# Patient Record
Sex: FEMALE | Race: WHITE | NOT HISPANIC OR LATINO | Employment: OTHER
[De-identification: names, ages, dates, MRNs, and addresses within clinical notes are randomized per-mention and may not be internally consistent; named-entity substitution may affect disease eponyms.]

---

## 2017-07-01 ENCOUNTER — HEALTH MAINTENANCE LETTER (OUTPATIENT)
Age: 57
End: 2017-07-01

## 2019-07-19 ENCOUNTER — TELEPHONE (OUTPATIENT)
Dept: SCHEDULING | Facility: CLINIC | Age: 59
End: 2019-07-19

## 2019-10-03 ENCOUNTER — HEALTH MAINTENANCE LETTER (OUTPATIENT)
Age: 59
End: 2019-10-03

## 2020-03-09 ENCOUNTER — TRANSFERRED RECORDS (OUTPATIENT)
Dept: PHYSICAL THERAPY | Facility: CLINIC | Age: 60
End: 2020-03-09

## 2020-03-09 NOTE — PROGRESS NOTES
[facility]  [address] [facility] [address] [phone]  Dept: [phone]    PRE-OP EVALUATION:  Today's date: 3/10/2020    Alma Mata (: 1960) presents for pre-operative evaluation assessment as requested by Dr. Shaffer.  She requires evaluation and anesthesia risk assessment prior to undergoing surgery/procedure for treatment of Cataracts- Bilateral .    Fax number for surgical facility: [phone]  Primary Physician: No primary care provider on file.  Type of Anesthesia Anticipated: to be determined    Patient has a Health Care Directive or Living Will:  YES     Preop Questions 3/7/2020   Who is doing your surgery? Dr. Kong Shaffer   What are you having done? cataract removal and lens insertion   Date of Surgery/Procedure: Left eye 3/16  right eye  cataract surgery   Facility or Hospital where procedure/surgery will be performed: [facility]   1.  Do you have a history of Heart attack, stroke, stent, coronary bypass surgery, or other heart surgery? No   2.  Do you ever have any pain or discomfort in your chest? No   3.  Do you have a history of  Heart Failure? No   4.   Are you troubled by shortness of breath when:  walking on a level surface, or up a slight hill, or at night? No   5.  Do you currently have a cold, bronchitis or other respiratory infection? No   6.  Do you have a cough, shortness of breath, or wheezing? No   7.  Do you sometimes get pains in the calves of your legs when you walk? No   8. Do you or anyone in your family have previous history of blood clots? No   9.  Do you or does anyone in your family have a serious bleeding problem such as prolonged bleeding following surgeries or cuts? No   10. Have you ever had problems with anemia or been told to take iron pills? No   11. Have you had any abnormal blood loss such as black, tarry or bloody stools, or abnormal vaginal bleeding? No   12. Have you ever had a blood transfusion? No    13. Have you or any of your relatives ever had problems with anesthesia? YES - Mother and sister both had nausea/vomiting after lengthy surgeries, no personal problems with anesthesia   14. Do you have sleep apnea, excessive snoring or daytime drowsiness? No   15. Do you have any prosthetic heart valves? No   16. Do you have prosthetic joints? No   17. Is there any chance that you may be pregnant? No         HPI:     HPI related to upcoming procedure: First diagnosed with glaucoma 4 years ago and subsequently diagnosed with bilateral cataracts last year. Will have a lens replacement with cataract removal.          MEDICAL HISTORY:     Patient Active Problem List    Diagnosis Date Noted     Glaucoma (increased eye pressure) 01/01/2018     Priority: Medium     Advanced directives, counseling/discussion 12/01/2016     Priority: Medium     Advance Care Planning 12/1/2016: ACP Review of Chart / Resources Provided:  Reviewed chart for advance care plan.  Alma ANTOINE Mata has no plan or code status on file however states presence of ACP document. Copy requested. Confirmed code status reflects current choices pending receipt of document/advance care plan review.  Confirmed/documented legally designated decision makers.  Added by Bel Browne           Partial rectal prolapse 01/01/2015     Priority: Medium     Allergic rhinitis 08/14/2007     Priority: Medium     Problem list name updated by automated process. Provider to review        Past Medical History:   Diagnosis Date     Arthritis     slight arthritis in right hand     CARDIOVASCULAR SCREENING; LDL GOAL LESS THAN 160 2/10/2010     Subclinical hyperthyroidism      Past Surgical History:   Procedure Laterality Date     COLONOSCOPY  1/6/2009     GI SURGERY  1/23/2009    Surgery for prolapsed rectum     SURGICAL HISTORY OF -   11/2003    Left thumb surgery after laceration     Current Outpatient Medications   Medication Sig Dispense Refill     ASPIRIN PO Take 81 mg by  "mouth       calcium-vitamin D 500-125 MG-UNIT TABS                      MULTI-VITAMIN OR TABS 1 TABLET DAILY 30 0            Timolol  Latanoprost               OTC products: None, except as noted above    No Known Allergies   Latex Allergy: NO    Social History     Tobacco Use     Smoking status: Never Smoker     Smokeless tobacco: Never Used   Substance Use Topics     Alcohol use: No     History   Drug Use No       REVIEW OF SYSTEMS:   CONSTITUTIONAL: NEGATIVE for fever, chills, change in weight  EYES: NEGATIVE for vision changes or irritation  ENT/MOUTH: NEGATIVE for ear, mouth and throat problems  RESP: NEGATIVE for significant cough or SOB  BREAST: NEGATIVE for masses, tenderness or discharge  CV: NEGATIVE for chest pain, palpitations or peripheral edema  GI: NEGATIVE for nausea, abdominal pain, heartburn, or change in bowel habits  : NEGATIVE for frequency, dysuria, or hematuria  MUSCULOSKELETAL: NEGATIVE for significant arthralgias or myalgia  NEURO: NEGATIVE for weakness, dizziness or paresthesias  HEME: NEGATIVE for bleeding problems  PSYCHIATRIC: NEGATIVE for changes in mood or affect    EXAM:   /82 (BP Location: Right arm, Patient Position: Sitting, Cuff Size: Adult Regular)   Pulse 83   Temp 98.6  F (37  C) (Oral)   Ht 1.753 m (5' 9\")   Wt 78.5 kg (173 lb)   SpO2 97%   BMI 25.55 kg/m      GENERAL APPEARANCE: healthy, alert and no acute distress     EYES: EOMI, PERRL     HENT: ear canals and TM's normal and nose and mouth without ulcers or lesions     NECK: no adenopathy, no asymmetry, masses, or scars and thyroid normal to palpation     RESP: lungs clear to auscultation - no rales, rhonchi or wheezes     CV: regular rates and rhythm, normal S1 S2, no S3 or S4 and no murmur, click or rub     ABDOMEN:  soft, nontender, no HSM or masses and bowel sounds normal     MS: extremities normal- no gross deformities noted, no evidence of inflammation in joints, FROM in all extremities.     NEURO: " Normal strength and tone, sensory exam grossly normal, mentation intact and speech normal, CN 2-12 intact     PSYCH: mentation appears normal. and affect normal/bright     LYMPHATICS: No cervical adenopathy    DIAGNOSTICS:   No labs or EKG required for low risk surgery (cataract, skin procedure, breast biopsy, etc)    Recent Labs   Lab Test 11/17/16  0830   HGB 12.2         POTASSIUM 4.0   CR 0.75        IMPRESSION:   Reason for surgery/procedure: Cataracts    The proposed surgical procedure is considered LOW risk.    REVISED CARDIAC RISK INDEX  The patient has the following serious cardiovascular risks for perioperative complications such as (MI, PE, VFib and 3  AV Block):  No serious cardiac risks  INTERPRETATION: 0 risks: Class I (very low risk - 0.4% complication rate)    The patient has the following additional risks for perioperative complications:  No identified additional risks      ICD-10-CM    1. Preop general physical exam  Z01.818    2. Cataract of both eyes, unspecified cataract type  H26.9    3. Lipid screening  Z13.220 Lipid panel reflex to direct LDL Fasting   4. Encounter for HCV screening test for low risk patient  Z11.59 **Hepatitis C Screen Reflex to RNA FUTURE anytime   5. Encounter for screening for human immunodeficiency virus (HIV)  Z11.4 **HIV Antigen Antibody Combo FUTURE anytime   6. Preventative health care  Z00.00 **Comprehensive metabolic panel FUTURE anytime     **CBC with platelets FUTURE anytime   7. Hyperthyroidism, subclinical  E05.90 **TSH with free T4 reflex FUTURE anytime   8. Screening for breast cancer  Z12.39 *MA Screening Digital Bilateral     Preoperative exam completed.  Future/preventative labs ordered and patient will follow-up for lab only appointment.    RECOMMENDATIONS:       --Patient is to take all scheduled medications on the day of surgery EXCEPT for modifications listed below: Aspirin stop 4 days before cataract surgery and 10 days prior for rectal  surgery.      APPROVAL GIVEN to proceed with proposed procedure, without further diagnostic evaluation       Maya Jimenez, RN, Student FNP, [employer]    History and physical examination done with student nurse practitioner.  I agree with assessment and plan for this patient.     Signed Electronically by: ABHAY Koroma CNP      [facility] Preop Guidelines    Revised Cardiac Risk Index

## 2020-03-10 ENCOUNTER — OFFICE VISIT (OUTPATIENT)
Dept: FAMILY MEDICINE | Facility: CLINIC | Age: 60
End: 2020-03-10
Payer: COMMERCIAL

## 2020-03-10 VITALS
DIASTOLIC BLOOD PRESSURE: 82 MMHG | BODY MASS INDEX: 25.62 KG/M2 | TEMPERATURE: 98.6 F | SYSTOLIC BLOOD PRESSURE: 128 MMHG | OXYGEN SATURATION: 97 % | HEIGHT: 69 IN | WEIGHT: 173 LBS | HEART RATE: 83 BPM

## 2020-03-10 DIAGNOSIS — Z12.39 SCREENING FOR BREAST CANCER: ICD-10-CM

## 2020-03-10 DIAGNOSIS — Z01.818 PREOP GENERAL PHYSICAL EXAM: Primary | ICD-10-CM

## 2020-03-10 DIAGNOSIS — H26.9 CATARACT OF BOTH EYES, UNSPECIFIED CATARACT TYPE: ICD-10-CM

## 2020-03-10 DIAGNOSIS — Z11.4 ENCOUNTER FOR SCREENING FOR HUMAN IMMUNODEFICIENCY VIRUS (HIV): ICD-10-CM

## 2020-03-10 DIAGNOSIS — Z11.59 ENCOUNTER FOR HCV SCREENING TEST FOR LOW RISK PATIENT: ICD-10-CM

## 2020-03-10 DIAGNOSIS — Z13.220 LIPID SCREENING: ICD-10-CM

## 2020-03-10 DIAGNOSIS — Z00.00 PREVENTATIVE HEALTH CARE: ICD-10-CM

## 2020-03-10 DIAGNOSIS — E05.90 HYPERTHYROIDISM, SUBCLINICAL: ICD-10-CM

## 2020-03-10 PROBLEM — H40.9 GLAUCOMA (INCREASED EYE PRESSURE): Status: ACTIVE | Noted: 2018-01-01

## 2020-03-10 PROCEDURE — 99203 OFFICE O/P NEW LOW 30 MIN: CPT | Performed by: NURSE PRACTITIONER

## 2020-03-10 RX ORDER — TIMOLOL MALEATE 5 MG/ML
1 SOLUTION/ DROPS OPHTHALMIC EVERY MORNING
COMMUNITY
Start: 2018-01-01

## 2020-03-10 RX ORDER — LATANOPROST 50 UG/ML
1 SOLUTION/ DROPS OPHTHALMIC EVERY EVENING
COMMUNITY
Start: 2018-01-01

## 2020-03-10 ASSESSMENT — MIFFLIN-ST. JEOR: SCORE: 1419.1

## 2020-03-10 NOTE — PROGRESS NOTES
[facility]  [address] [id number] [phone]  Dept: [phone]    PRE-OP EVALUATION:  Today's date: 3/10/2020    Alma Mtaa (: 1960) presents for pre-operative evaluation assessment as requested by  ***.  She requires evaluation and anesthesia risk assessment prior to undergoing surgery/procedure for treatment of *** .    {PREOP QUESTIONNAIRE OPTIONS (by MA):621863}    HPI:     HPI related to upcoming procedure: ***      {Ch. Problems:462953}    MEDICAL HISTORY:     Patient Active Problem List    Diagnosis Date Noted     Advanced directives, counseling/discussion 2016     Priority: Medium     Advance Care Planning 2016: ACP Review of Chart / Resources Provided:  Reviewed chart for advance care plan.  Alma Mata has no plan or code status on file however states presence of ACP document. Copy requested. Confirmed code status reflects current choices pending receipt of document/advance care plan review.  Confirmed/documented legally designated decision makers.  Added by Bel Browne           Subclinical hyperthyroidism 2010     Priority: Medium     CARDIOVASCULAR SCREENING; LDL GOAL LESS THAN 160 02/10/2010     Priority: Medium     Allergic rhinitis 2007     Priority: Medium     Problem list name updated by automated process. Provider to review        No past medical history on file.  Past Surgical History:   Procedure Laterality Date     SURGICAL HISTORY OF -   2003    Left thumb surgery after laceration     Current Outpatient Medications   Medication Sig Dispense Refill     ASPIRIN PO Take 81 mg by mouth       calcium-vitamin D 500-125 MG-UNIT TABS        Cyanocobalamin (VITAMIN B 12 PO)        DiphenhydrAMINE HCl (BENADRYL PO)        MULTI-VITAMIN OR TABS 1 TABLET DAILY 30 0     Pseudoephedrine HCl (SUDAFED PO)        VITAMIN E COMPLEX PO        ZYRTEC 10 MG PO TABS 1 TABLET DAILY       OTC products: {OTC ANALGESICS:757646}    No  "Known Allergies   Latex Allergy: {YES/NO WITH DEFAULT:928758::\"NO\"}    Social History     Tobacco Use     Smoking status: Never Smoker     Smokeless tobacco: Never Used   Substance Use Topics     Alcohol use: No     History   Drug Use No       REVIEW OF SYSTEMS:   {ROS Preop Choices:881657}    EXAM:   There were no vitals taken for this visit.  {EXAM Preop Choices:424486}    DIAGNOSTICS:   {DIAGNOSTIC FOR PREOP:789140}    Recent Labs   Lab Test 11/17/16  0830   HGB 12.2         POTASSIUM 4.0   CR 0.75        IMPRESSION:   {PREOP REASONS:568614::\"Reason for surgery/procedure: ***\",\"Diagnosis/reason for consult: ***\"}    The proposed surgical procedure is considered {HIGH=major cardiovascular or procedures requiring prolonged anesthesia >4 hours or large fluid shifts;    INTERMEDIATE=abdominal, most orthopedic and intrathoracic surgery; LOW= endoscopy, cataract and breast surgery:010369} risk.    REVISED CARDIAC RISK INDEX  The patient has the following serious cardiovascular risks for perioperative complications such as (MI, PE, VFib and 3  AV Block):  {PREOP REVISED CARDIAC INDEX (RCI):879264:p:\"No serious cardiac risks\"}  INTERPRETATION: {REVISED CARDIAC RISK INTERPRETATION:360221}    The patient has the following additional risks for perioperative complications:  {Additional perioperative risks:553044:p:\"No identified additional risks\"}      ICD-10-CM    1. Preop general physical exam  Z01.818    2. Screening for malignant neoplasm of cervix  Z12.4 Pap imaged thin layer screen with HPV - recommended age 30 - 65 years (select HPV order below)     HPV High Risk Types DNA Cervical       RECOMMENDATIONS:     {IMPORTANT - Conditions - complete carefully!!:871337}    {IMPORTANT - Medications:686241::\"--Patient is to take all scheduled medications on the day of surgery EXCEPT for modifications listed below.\"}    {IMPORTANT - Approval:315996:p:\"APPROVAL GIVEN to proceed with proposed procedure, without " "further diagnostic evaluation\"}       Signed Electronically by: ABHAY Koroma CNP    Copy of this evaluation report is provided to requesting physician.    [facility] Preop Guidelines    Revised Cardiac Risk Index  "

## 2020-03-12 ENCOUNTER — TELEPHONE (OUTPATIENT)
Dept: FAMILY MEDICINE | Facility: CLINIC | Age: 60
End: 2020-03-12

## 2020-03-12 NOTE — TELEPHONE ENCOUNTER
[facility] is calling because they need the pre-op signed for upcoming surgery.    Carisa Diaz  Patient Representative

## 2020-05-01 DIAGNOSIS — Z11.59 ENCOUNTER FOR SCREENING FOR OTHER VIRAL DISEASES: Primary | ICD-10-CM

## 2020-05-04 ENCOUNTER — OFFICE VISIT (OUTPATIENT)
Dept: URGENT CARE | Facility: URGENT CARE | Age: 60
End: 2020-05-04
Attending: COLON & RECTAL SURGERY
Payer: COMMERCIAL

## 2020-05-04 DIAGNOSIS — Z11.59 ENCOUNTER FOR SCREENING FOR OTHER VIRAL DISEASES: ICD-10-CM

## 2020-05-04 PROCEDURE — 99207 ZZC NO BILLABLE SERVICE THIS VISIT: CPT

## 2020-05-04 PROCEDURE — 99000 SPECIMEN HANDLING OFFICE-LAB: CPT | Performed by: COLON & RECTAL SURGERY

## 2020-05-04 PROCEDURE — U0003 INFECTIOUS AGENT DETECTION BY NUCLEIC ACID (DNA OR RNA); SEVERE ACUTE RESPIRATORY SYNDROME CORONAVIRUS 2 (SARS-COV-2) (CORONAVIRUS DISEASE [COVID-19]), AMPLIFIED PROBE TECHNIQUE, MAKING USE OF HIGH THROUGHPUT TECHNOLOGIES AS DESCRIBED BY CMS-2020-01-R: HCPCS | Mod: 90 | Performed by: COLON & RECTAL SURGERY

## 2020-05-05 ENCOUNTER — OFFICE VISIT (OUTPATIENT)
Dept: FAMILY MEDICINE | Facility: CLINIC | Age: 60
End: 2020-05-05
Payer: COMMERCIAL

## 2020-05-05 VITALS
HEIGHT: 69 IN | BODY MASS INDEX: 25.77 KG/M2 | WEIGHT: 174 LBS | TEMPERATURE: 97.5 F | DIASTOLIC BLOOD PRESSURE: 68 MMHG | HEART RATE: 91 BPM | SYSTOLIC BLOOD PRESSURE: 110 MMHG | OXYGEN SATURATION: 97 %

## 2020-05-05 DIAGNOSIS — H26.9 CATARACT OF RIGHT EYE, UNSPECIFIED CATARACT TYPE: ICD-10-CM

## 2020-05-05 DIAGNOSIS — K62.3 RECTAL PROLAPSE: ICD-10-CM

## 2020-05-05 DIAGNOSIS — Z01.818 PREOP GENERAL PHYSICAL EXAM: Primary | ICD-10-CM

## 2020-05-05 LAB
ERYTHROCYTE [DISTWIDTH] IN BLOOD BY AUTOMATED COUNT: 15.8 % (ref 10–15)
HCT VFR BLD AUTO: 37.3 % (ref 35–47)
HGB BLD-MCNC: 11.8 G/DL (ref 11.7–15.7)
MCH RBC QN AUTO: 25 PG (ref 26.5–33)
MCHC RBC AUTO-ENTMCNC: 31.6 G/DL (ref 31.5–36.5)
MCV RBC AUTO: 79 FL (ref 78–100)
PLATELET # BLD AUTO: 302 10E9/L (ref 150–450)
RBC # BLD AUTO: 4.72 10E12/L (ref 3.8–5.2)
SARS-COV-2 RNA SPEC QL NAA+PROBE: NOT DETECTED
SPECIMEN SOURCE: NORMAL
WBC # BLD AUTO: 4.5 10E9/L (ref 4–11)

## 2020-05-05 PROCEDURE — 85027 COMPLETE CBC AUTOMATED: CPT | Performed by: FAMILY MEDICINE

## 2020-05-05 PROCEDURE — 99214 OFFICE O/P EST MOD 30 MIN: CPT | Performed by: FAMILY MEDICINE

## 2020-05-05 PROCEDURE — 36415 COLL VENOUS BLD VENIPUNCTURE: CPT | Performed by: FAMILY MEDICINE

## 2020-05-05 ASSESSMENT — MIFFLIN-ST. JEOR: SCORE: 1423.64

## 2020-05-05 NOTE — PROGRESS NOTES
[facility] PRIOR [address] [address]  [phone]  Dept: [phone]    PRE-OP EVALUATION:  Today's date: 2020    Alma Mata (: 1960) presents for pre-operative evaluation assessment as requested by Dr. Kothari and Dr. Caldwell.  She requires evaluation and anesthesia risk assessment prior to undergoing surgery/procedure for treatment of ROBOTIC ASSISTED RECTOPEXY WITH MESH AND PROCTOSCOPY .     Also having right cataract surgery on  -- with Dr. Shaffer    Fax number for surgical facility:  Primary Physician: Margaret Parr  Type of Anesthesia Anticipated: General    Patient has a Health Care Directive or Living Will:  NO    Preop Questions 2020   Who is doing your surgery? Dr. Jo Ann Kothari   What are you having done? Retropexy   Date of Surgery/Procedure: 2020   Facility or Hospital where procedure/surgery will be performed: [facility]   1.  Do you have a history of Heart attack, stroke, stent, coronary bypass surgery, or other heart surgery? No   2.  Do you ever have any pain or discomfort in your chest? No   3.  Do you have a history of  Heart Failure? No   4.   Are you troubled by shortness of breath when:  walking on a level surface, or up a slight hill, or at night? No   5.  Do you currently have a cold, bronchitis or other respiratory infection? No   6.  Do you have a cough, shortness of breath, or wheezing? No   7.  Do you sometimes get pains in the calves of your legs when you walk? No   8. Do you or anyone in your family have previous history of blood clots? No   9.  Do you or does anyone in your family have a serious bleeding problem such as prolonged bleeding following surgeries or cuts? No   10. Have you ever had problems with anemia or been told to take iron pills? No   11. Have you had any abnormal blood loss such as black, tarry or bloody stools, or abnormal vaginal bleeding? No   12. Have you ever had a blood  transfusion? No   13. Have you or any of your relatives ever had problems with anesthesia? YES, had a bit of nausea int the past.   14. Do you have sleep apnea, excessive snoring or daytime drowsiness? No   15. Do you have any prosthetic heart valves? No   16. Do you have prosthetic joints? No   17. Is there any chance that you may be pregnant? No         HPI:     HPI related to upcoming procedure: history of rectal prolapse. Has had 2 surgeries in 2009 with recurrence.    Also having right cataract surgery. She had left cataract sugery on 3/16. Right was initially cancelled due to COVID-19 but now rescheduled for June 1.       See problem list for active medical problems.  Problems all longstanding and stable, except as noted/documented.  See ROS for pertinent symptoms related to these conditions.      MEDICAL HISTORY:     Patient Active Problem List    Diagnosis Date Noted     Glaucoma (increased eye pressure) 01/01/2018     Priority: Medium     Advanced directives, counseling/discussion 12/01/2016     Priority: Medium     Advance Care Planning 12/1/2016: ACP Review of Chart / Resources Provided:  Reviewed chart for advance care plan.  Alma Mata has no plan or code status on file however states presence of ACP document. Copy requested. Confirmed code status reflects current choices pending receipt of document/advance care plan review.  Confirmed/documented legally designated decision makers.  Added by Bel Browne           Partial rectal prolapse 01/01/2015     Priority: Medium     Allergic rhinitis 08/14/2007     Priority: Medium     Problem list name updated by automated process. Provider to review        Past Medical History:   Diagnosis Date     Arthritis     slight arthritis in right hand     CARDIOVASCULAR SCREENING; LDL GOAL LESS THAN 160 2/10/2010     Subclinical hyperthyroidism      Past Surgical History:   Procedure Laterality Date     COLONOSCOPY  1/6/2009     GI SURGERY  1/23/2009    Surgery for  "prolapsed rectum     SURGICAL HISTORY OF -   11/2003    Left thumb surgery after laceration     Current Outpatient Medications   Medication Sig Dispense Refill     ASPIRIN PO Take 81 mg by mouth       calcium-vitamin D 500-125 MG-UNIT TABS        DiphenhydrAMINE HCl (BENADRYL PO)        latanoprost (XALATAN) 0.005 % ophthalmic solution        MULTI-VITAMIN OR TABS 1 TABLET DAILY 30 0     Pseudoephedrine HCl (SUDAFED PO)        timolol maleate (TIMOPTIC) 0.5 % ophthalmic solution        ZYRTEC 10 MG PO TABS 1 TABLET DAILY       OTC products: None, except as noted above    No Known Allergies   Latex Allergy: NO    Social History     Tobacco Use     Smoking status: Never Smoker     Smokeless tobacco: Never Used   Substance Use Topics     Alcohol use: No     History   Drug Use No       REVIEW OF SYSTEMS:   CONSTITUTIONAL: NEGATIVE for fever, chills, change in weight  INTEGUMENTARY/SKIN: NEGATIVE for worrisome rashes, moles or lesions  EYES: NEGATIVE for vision changes or irritation  ENT/MOUTH: NEGATIVE for ear, mouth and throat problems  RESP: NEGATIVE for significant cough or SOB  BREAST: NEGATIVE for masses, tenderness or discharge  CV: NEGATIVE for chest pain, palpitations or peripheral edema  GI: NEGATIVE for nausea, abdominal pain, heartburn, or change in bowel habits  : NEGATIVE for frequency, dysuria, or hematuria  MUSCULOSKELETAL: NEGATIVE for significant arthralgias or myalgia  NEURO: NEGATIVE for weakness, dizziness or paresthesias  ENDOCRINE: NEGATIVE for temperature intolerance, skin/hair changes  HEME: NEGATIVE for bleeding problems  PSYCHIATRIC: NEGATIVE for changes in mood or affect    EXAM:   /68 (BP Location: Right arm, Cuff Size: Adult Regular)   Pulse 91   Temp 97.5  F (36.4  C) (Oral)   Ht 1.753 m (5' 9\")   Wt 78.9 kg (174 lb)   SpO2 97%   BMI 25.70 kg/m      GENERAL APPEARANCE: healthy, alert and no distress     EYES: EOMI, PERRL     HENT: ear canals and TM's normal and nose and mouth " without ulcers or lesions     NECK: no adenopathy, no asymmetry, masses, or scars and thyroid normal to palpation     RESP: lungs clear to auscultation - no rales, rhonchi or wheezes     CV: regular rates and rhythm, normal S1 S2, no S3 or S4 and no murmur, click or rub     ABDOMEN:  soft, nontender, no HSM or masses and bowel sounds normal     MS: extremities normal- no gross deformities noted, no evidence of inflammation in joints, FROM in all extremities.     SKIN: no suspicious lesions or rashes     NEURO: Normal strength and tone, sensory exam grossly normal, mentation intact and speech normal     PSYCH: mentation appears normal. and affect normal/bright     LYMPHATICS: No cervical adenopathy    DIAGNOSTICS:   EKG: Not indicated due to non-vascular surgery and low risk of event (age <65 and without cardiac risk factors)    Recent Labs   Lab Test 11/17/16  0830   HGB 12.2         POTASSIUM 4.0   CR 0.75        IMPRESSION:   Reason for surgery/procedure: rectal prolapse  Diagnosis/reason for consult: preoperative clearance    The proposed surgical procedure is considered INTERMEDIATE risk.    REVISED CARDIAC RISK INDEX  The patient has the following serious cardiovascular risks for perioperative complications such as (MI, PE, VFib and 3  AV Block):  No serious cardiac risks  INTERPRETATION: 0 risks: Class I (very low risk - 0.4% complication rate)    The patient has the following additional risks for perioperative complications:  No identified additional risks      ICD-10-CM    1. Preop general physical exam  Z01.818    2. Rectal prolapse  K62.3 CBC with platelets   3. Cataract of right eye, unspecified cataract type  H26.9        RECOMMENDATIONS:       --Patient is to take all scheduled medications on the day of surgery EXCEPT for modifications listed below.    Anticoagulant or Antiplatelet Medication Use  ASPIRIN: Discontinue ASA 7-10 days prior to procedure to reduce bleeding risk.  It should be  resumed post-operatively.        APPROVAL GIVEN to proceed with proposed procedures, without further diagnostic evaluation       Signed Electronically by: Kong Barraza DO    Copy of this evaluation report is provided to requesting physician.    [facility] Preop Guidelines    Revised Cardiac Risk Index

## 2020-05-07 ENCOUNTER — ANESTHESIA EVENT (OUTPATIENT)
Dept: SURGERY | Facility: CLINIC | Age: 60
DRG: 331 | End: 2020-05-07
Payer: COMMERCIAL

## 2020-05-07 RX ORDER — FLUTICASONE PROPIONATE 50 MCG
1 SPRAY, SUSPENSION (ML) NASAL DAILY PRN
COMMUNITY

## 2020-05-07 RX ORDER — PSEUDOEPHEDRINE HCL 30 MG
TABLET ORAL EVERY 4 HOURS PRN
COMMUNITY
End: 2023-06-07

## 2020-05-07 RX ORDER — DIPHENHYDRAMINE HCL 25 MG
25 TABLET ORAL
COMMUNITY

## 2020-05-07 RX ORDER — CETIRIZINE HYDROCHLORIDE 10 MG/1
10 TABLET ORAL DAILY PRN
COMMUNITY
End: 2023-06-07

## 2020-05-07 NOTE — PROGRESS NOTES
PTA medications updated by Medication Scribe day before surgery via phone call with patient      -LAST DOSES ENTERED BY NURSE-    Medication history sources: Patient, Surescripts and H&P  Medication history source reliability: Good  Adherence assessment: N/A Not Observed    Significant changes made to the medication list:  None      Additional medication history information:   Pt intends to bring home meds: Timolol eye drops, Latanoprost eye drops, Flonase Nasal spray        Prior to Admission medications    Medication Sig Last Dose Taking? Auth Provider   ASPIRIN PO Take 81 mg by mouth every evening   at PM Yes Reported, Patient   calcium-vitamin D 500-125 MG-UNIT TABS Take 1 tablet by mouth daily   at AM Yes Reported, Patient   cetirizine (ZYRTEC) 10 MG tablet Take 10 mg by mouth daily as needed for allergies (seasonal allergies)  at prn Yes Reported, Patient   diphenhydrAMINE (BENADRYL) 25 MG tablet Take 25 mg by mouth nightly as needed for itching or allergies (allergies)  Yes Reported, Patient   fluticasone (FLONASE) 50 MCG/ACT nasal spray Spray 1 spray into both nostrils daily  at AM Yes Reported, Patient   latanoprost (XALATAN) 0.005 % ophthalmic solution Place 1 drop into both eyes every evening   at PM Yes Reported, Patient   MULTI-VITAMIN OR TABS Take 1 tablet by mouth daily   at AM Yes Dayami Cruz MD   pseudoePHEDrine (SUDAFED) 30 MG tablet Take by mouth every 4 hours as needed for congestion (seasonal allergies)  at prn Yes Reported, Patient   timolol maleate (TIMOPTIC) 0.5 % ophthalmic solution Place 1 drop into both eyes every morning   Yes Reported, Patient

## 2020-05-08 ENCOUNTER — HOSPITAL ENCOUNTER (INPATIENT)
Facility: CLINIC | Age: 60
LOS: 1 days | Discharge: HOME | DRG: 331 | End: 2020-05-09
Attending: COLON & RECTAL SURGERY | Admitting: COLON & RECTAL SURGERY
Payer: COMMERCIAL

## 2020-05-08 ENCOUNTER — ANESTHESIA (OUTPATIENT)
Dept: SURGERY | Facility: CLINIC | Age: 60
DRG: 331 | End: 2020-05-08
Payer: COMMERCIAL

## 2020-05-08 DIAGNOSIS — K62.3 RECTAL PROLAPSE: Primary | ICD-10-CM

## 2020-05-08 PROCEDURE — 25000128 H RX IP 250 OP 636: Performed by: COLON & RECTAL SURGERY

## 2020-05-08 PROCEDURE — 25000128 H RX IP 250 OP 636: Performed by: NURSE ANESTHETIST, CERTIFIED REGISTERED

## 2020-05-08 PROCEDURE — 25000125 ZZHC RX 250: Performed by: PHYSICIAN ASSISTANT

## 2020-05-08 PROCEDURE — 25000132 ZZH RX MED GY IP 250 OP 250 PS 637: Performed by: COLON & RECTAL SURGERY

## 2020-05-08 PROCEDURE — 37000009 ZZH ANESTHESIA TECHNICAL FEE, EACH ADDTL 15 MIN: Performed by: COLON & RECTAL SURGERY

## 2020-05-08 PROCEDURE — 25000125 ZZHC RX 250: Performed by: NURSE ANESTHETIST, CERTIFIED REGISTERED

## 2020-05-08 PROCEDURE — 25000128 H RX IP 250 OP 636: Performed by: ANESTHESIOLOGY

## 2020-05-08 PROCEDURE — 25800030 ZZH RX IP 258 OP 636: Performed by: ANESTHESIOLOGY

## 2020-05-08 PROCEDURE — 36000087 ZZH SURGERY LEVEL 8 EA 15 ADDTL MIN: Performed by: COLON & RECTAL SURGERY

## 2020-05-08 PROCEDURE — 25800030 ZZH RX IP 258 OP 636: Performed by: NURSE ANESTHETIST, CERTIFIED REGISTERED

## 2020-05-08 PROCEDURE — 25000566 ZZH SEVOFLURANE, EA 15 MIN: Performed by: COLON & RECTAL SURGERY

## 2020-05-08 PROCEDURE — 0DSP4ZZ REPOSITION RECTUM, PERCUTANEOUS ENDOSCOPIC APPROACH: ICD-10-PCS | Performed by: COLON & RECTAL SURGERY

## 2020-05-08 PROCEDURE — 0DJD8ZZ INSPECTION OF LOWER INTESTINAL TRACT, VIA NATURAL OR ARTIFICIAL OPENING ENDOSCOPIC: ICD-10-PCS | Performed by: COLON & RECTAL SURGERY

## 2020-05-08 PROCEDURE — 71000013 ZZH RECOVERY PHASE 1 LEVEL 1 EA ADDTL HR: Performed by: COLON & RECTAL SURGERY

## 2020-05-08 PROCEDURE — 8E0W4CZ ROBOTIC ASSISTED PROCEDURE OF TRUNK REGION, PERCUTANEOUS ENDOSCOPIC APPROACH: ICD-10-PCS | Performed by: COLON & RECTAL SURGERY

## 2020-05-08 PROCEDURE — C1781 MESH (IMPLANTABLE): HCPCS | Performed by: COLON & RECTAL SURGERY

## 2020-05-08 PROCEDURE — 25800030 ZZH RX IP 258 OP 636: Performed by: COLON & RECTAL SURGERY

## 2020-05-08 PROCEDURE — 36000085 ZZH SURGERY LEVEL 8 1ST 30 MIN: Performed by: COLON & RECTAL SURGERY

## 2020-05-08 PROCEDURE — 40000169 ZZH STATISTIC PRE-PROCEDURE ASSESSMENT I: Performed by: COLON & RECTAL SURGERY

## 2020-05-08 PROCEDURE — 25800025 ZZH RX 258: Performed by: COLON & RECTAL SURGERY

## 2020-05-08 PROCEDURE — 0DUP0JZ SUPPLEMENT RECTUM WITH SYNTHETIC SUBSTITUTE, OPEN APPROACH: ICD-10-PCS | Performed by: COLON & RECTAL SURGERY

## 2020-05-08 PROCEDURE — 27210794 ZZH OR GENERAL SUPPLY STERILE: Performed by: COLON & RECTAL SURGERY

## 2020-05-08 PROCEDURE — 71000012 ZZH RECOVERY PHASE 1 LEVEL 1 FIRST HR: Performed by: COLON & RECTAL SURGERY

## 2020-05-08 PROCEDURE — 12000000 ZZH R&B MED SURG/OB

## 2020-05-08 PROCEDURE — 25000125 ZZHC RX 250: Performed by: COLON & RECTAL SURGERY

## 2020-05-08 PROCEDURE — 0USG4ZZ REPOSITION VAGINA, PERCUTANEOUS ENDOSCOPIC APPROACH: ICD-10-PCS | Performed by: COLON & RECTAL SURGERY

## 2020-05-08 PROCEDURE — 37000008 ZZH ANESTHESIA TECHNICAL FEE, 1ST 30 MIN: Performed by: COLON & RECTAL SURGERY

## 2020-05-08 DEVICE — MESH SLING FLAT RESTORELLE 24X8CM 501440: Type: IMPLANTABLE DEVICE | Site: ABDOMEN | Status: FUNCTIONAL

## 2020-05-08 RX ORDER — SCOLOPAMINE TRANSDERMAL SYSTEM 1 MG/1
1 PATCH, EXTENDED RELEASE TRANSDERMAL
Status: DISCONTINUED | OUTPATIENT
Start: 2020-05-08 | End: 2020-05-09 | Stop reason: HOSPADM

## 2020-05-08 RX ORDER — LIDOCAINE 40 MG/G
CREAM TOPICAL
Status: DISCONTINUED | OUTPATIENT
Start: 2020-05-08 | End: 2020-05-09 | Stop reason: HOSPADM

## 2020-05-08 RX ORDER — BUPIVACAINE HYDROCHLORIDE 2.5 MG/ML
INJECTION, SOLUTION INFILTRATION; PERINEURAL PRN
Status: DISCONTINUED | OUTPATIENT
Start: 2020-05-08 | End: 2020-05-08 | Stop reason: HOSPADM

## 2020-05-08 RX ORDER — CEFAZOLIN SODIUM 2 G/100ML
2 INJECTION, SOLUTION INTRAVENOUS
Status: COMPLETED | OUTPATIENT
Start: 2020-05-08 | End: 2020-05-08

## 2020-05-08 RX ORDER — SODIUM CHLORIDE, SODIUM LACTATE, POTASSIUM CHLORIDE, CALCIUM CHLORIDE 600; 310; 30; 20 MG/100ML; MG/100ML; MG/100ML; MG/100ML
INJECTION, SOLUTION INTRAVENOUS CONTINUOUS PRN
Status: DISCONTINUED | OUTPATIENT
Start: 2020-05-08 | End: 2020-05-08

## 2020-05-08 RX ORDER — MAGNESIUM HYDROXIDE 1200 MG/15ML
LIQUID ORAL PRN
Status: DISCONTINUED | OUTPATIENT
Start: 2020-05-08 | End: 2020-05-08 | Stop reason: HOSPADM

## 2020-05-08 RX ORDER — TIMOLOL MALEATE 5 MG/ML
1 SOLUTION/ DROPS OPHTHALMIC EVERY MORNING
Status: DISCONTINUED | OUTPATIENT
Start: 2020-05-09 | End: 2020-05-09 | Stop reason: HOSPADM

## 2020-05-08 RX ORDER — NEOSTIGMINE METHYLSULFATE 1 MG/ML
VIAL (ML) INJECTION PRN
Status: DISCONTINUED | OUTPATIENT
Start: 2020-05-08 | End: 2020-05-08

## 2020-05-08 RX ORDER — KETOROLAC TROMETHAMINE 30 MG/ML
INJECTION, SOLUTION INTRAMUSCULAR; INTRAVENOUS PRN
Status: DISCONTINUED | OUTPATIENT
Start: 2020-05-08 | End: 2020-05-08

## 2020-05-08 RX ORDER — ONDANSETRON 2 MG/ML
4 INJECTION INTRAMUSCULAR; INTRAVENOUS EVERY 30 MIN PRN
Status: DISCONTINUED | OUTPATIENT
Start: 2020-05-08 | End: 2020-05-08 | Stop reason: CLARIF

## 2020-05-08 RX ORDER — ONDANSETRON 2 MG/ML
4 INJECTION INTRAMUSCULAR; INTRAVENOUS EVERY 6 HOURS PRN
Status: DISCONTINUED | OUTPATIENT
Start: 2020-05-08 | End: 2020-05-09 | Stop reason: HOSPADM

## 2020-05-08 RX ORDER — SODIUM CHLORIDE, SODIUM LACTATE, POTASSIUM CHLORIDE, CALCIUM CHLORIDE 600; 310; 30; 20 MG/100ML; MG/100ML; MG/100ML; MG/100ML
INJECTION, SOLUTION INTRAVENOUS CONTINUOUS
Status: DISCONTINUED | OUTPATIENT
Start: 2020-05-08 | End: 2020-05-08 | Stop reason: CLARIF

## 2020-05-08 RX ORDER — LABETALOL HYDROCHLORIDE 5 MG/ML
10 INJECTION, SOLUTION INTRAVENOUS
Status: DISCONTINUED | OUTPATIENT
Start: 2020-05-08 | End: 2020-05-08 | Stop reason: CLARIF

## 2020-05-08 RX ORDER — DEXAMETHASONE SODIUM PHOSPHATE 4 MG/ML
INJECTION, SOLUTION INTRA-ARTICULAR; INTRALESIONAL; INTRAMUSCULAR; INTRAVENOUS; SOFT TISSUE PRN
Status: DISCONTINUED | OUTPATIENT
Start: 2020-05-08 | End: 2020-05-08

## 2020-05-08 RX ORDER — FENTANYL CITRATE 50 UG/ML
25-50 INJECTION, SOLUTION INTRAMUSCULAR; INTRAVENOUS
Status: DISCONTINUED | OUTPATIENT
Start: 2020-05-08 | End: 2020-05-08 | Stop reason: CLARIF

## 2020-05-08 RX ORDER — GLYCOPYRROLATE 0.2 MG/ML
INJECTION, SOLUTION INTRAMUSCULAR; INTRAVENOUS PRN
Status: DISCONTINUED | OUTPATIENT
Start: 2020-05-08 | End: 2020-05-08

## 2020-05-08 RX ORDER — PROPOFOL 10 MG/ML
INJECTION, EMULSION INTRAVENOUS CONTINUOUS PRN
Status: DISCONTINUED | OUTPATIENT
Start: 2020-05-08 | End: 2020-05-08

## 2020-05-08 RX ORDER — HYDROMORPHONE HYDROCHLORIDE 1 MG/ML
.3-.5 INJECTION, SOLUTION INTRAMUSCULAR; INTRAVENOUS; SUBCUTANEOUS EVERY 4 HOURS PRN
Status: DISCONTINUED | OUTPATIENT
Start: 2020-05-08 | End: 2020-05-09 | Stop reason: HOSPADM

## 2020-05-08 RX ORDER — DEXAMETHASONE SODIUM PHOSPHATE 4 MG/ML
4 INJECTION, SOLUTION INTRA-ARTICULAR; INTRALESIONAL; INTRAMUSCULAR; INTRAVENOUS; SOFT TISSUE
Status: DISCONTINUED | OUTPATIENT
Start: 2020-05-08 | End: 2020-05-08 | Stop reason: CLARIF

## 2020-05-08 RX ORDER — LIDOCAINE HYDROCHLORIDE 20 MG/ML
INJECTION, SOLUTION INFILTRATION; PERINEURAL PRN
Status: DISCONTINUED | OUTPATIENT
Start: 2020-05-08 | End: 2020-05-08

## 2020-05-08 RX ORDER — HYDRALAZINE HYDROCHLORIDE 20 MG/ML
2.5-5 INJECTION INTRAMUSCULAR; INTRAVENOUS EVERY 10 MIN PRN
Status: DISCONTINUED | OUTPATIENT
Start: 2020-05-08 | End: 2020-05-08 | Stop reason: CLARIF

## 2020-05-08 RX ORDER — HYDROMORPHONE HYDROCHLORIDE 1 MG/ML
.3-.5 INJECTION, SOLUTION INTRAMUSCULAR; INTRAVENOUS; SUBCUTANEOUS EVERY 5 MIN PRN
Status: DISCONTINUED | OUTPATIENT
Start: 2020-05-08 | End: 2020-05-08 | Stop reason: CLARIF

## 2020-05-08 RX ORDER — CEFAZOLIN SODIUM 1 G/3ML
1 INJECTION, POWDER, FOR SOLUTION INTRAMUSCULAR; INTRAVENOUS SEE ADMIN INSTRUCTIONS
Status: DISCONTINUED | OUTPATIENT
Start: 2020-05-08 | End: 2020-05-09

## 2020-05-08 RX ORDER — SODIUM CHLORIDE, SODIUM LACTATE, POTASSIUM CHLORIDE, CALCIUM CHLORIDE 600; 310; 30; 20 MG/100ML; MG/100ML; MG/100ML; MG/100ML
INJECTION, SOLUTION INTRAVENOUS CONTINUOUS
Status: DISCONTINUED | OUTPATIENT
Start: 2020-05-08 | End: 2020-05-09

## 2020-05-08 RX ORDER — LATANOPROST 50 UG/ML
1 SOLUTION/ DROPS OPHTHALMIC EVERY EVENING
Status: DISCONTINUED | OUTPATIENT
Start: 2020-05-08 | End: 2020-05-09 | Stop reason: HOSPADM

## 2020-05-08 RX ORDER — OXYCODONE HYDROCHLORIDE 5 MG/1
5 TABLET ORAL EVERY 4 HOURS PRN
Status: DISCONTINUED | OUTPATIENT
Start: 2020-05-08 | End: 2020-05-09 | Stop reason: HOSPADM

## 2020-05-08 RX ORDER — NALOXONE HYDROCHLORIDE 0.4 MG/ML
.1-.4 INJECTION, SOLUTION INTRAMUSCULAR; INTRAVENOUS; SUBCUTANEOUS
Status: DISCONTINUED | OUTPATIENT
Start: 2020-05-08 | End: 2020-05-09 | Stop reason: HOSPADM

## 2020-05-08 RX ORDER — FLUTICASONE PROPIONATE 50 MCG
1 SPRAY, SUSPENSION (ML) NASAL DAILY
Status: DISCONTINUED | OUTPATIENT
Start: 2020-05-09 | End: 2020-05-09 | Stop reason: HOSPADM

## 2020-05-08 RX ORDER — ONDANSETRON 4 MG/1
4 TABLET, ORALLY DISINTEGRATING ORAL EVERY 30 MIN PRN
Status: DISCONTINUED | OUTPATIENT
Start: 2020-05-08 | End: 2020-05-08 | Stop reason: CLARIF

## 2020-05-08 RX ORDER — ACETAMINOPHEN 325 MG/1
975 TABLET ORAL ONCE
Status: COMPLETED | OUTPATIENT
Start: 2020-05-08 | End: 2020-05-08

## 2020-05-08 RX ORDER — NALOXONE HYDROCHLORIDE 0.4 MG/ML
.1-.4 INJECTION, SOLUTION INTRAMUSCULAR; INTRAVENOUS; SUBCUTANEOUS
Status: DISCONTINUED | OUTPATIENT
Start: 2020-05-08 | End: 2020-05-09

## 2020-05-08 RX ORDER — KETOROLAC TROMETHAMINE 30 MG/ML
30 INJECTION, SOLUTION INTRAMUSCULAR; INTRAVENOUS
Status: DISCONTINUED | OUTPATIENT
Start: 2020-05-08 | End: 2020-05-08 | Stop reason: CLARIF

## 2020-05-08 RX ORDER — ONDANSETRON 2 MG/ML
INJECTION INTRAMUSCULAR; INTRAVENOUS PRN
Status: DISCONTINUED | OUTPATIENT
Start: 2020-05-08 | End: 2020-05-08

## 2020-05-08 RX ORDER — EPHEDRINE SULFATE 50 MG/ML
INJECTION, SOLUTION INTRAMUSCULAR; INTRAVENOUS; SUBCUTANEOUS PRN
Status: DISCONTINUED | OUTPATIENT
Start: 2020-05-08 | End: 2020-05-08

## 2020-05-08 RX ORDER — ACETAMINOPHEN 325 MG/1
975 TABLET ORAL EVERY 8 HOURS
Status: DISCONTINUED | OUTPATIENT
Start: 2020-05-08 | End: 2020-05-09 | Stop reason: HOSPADM

## 2020-05-08 RX ORDER — KETOROLAC TROMETHAMINE 15 MG/ML
15 INJECTION, SOLUTION INTRAMUSCULAR; INTRAVENOUS EVERY 6 HOURS PRN
Status: DISCONTINUED | OUTPATIENT
Start: 2020-05-08 | End: 2020-05-09 | Stop reason: HOSPADM

## 2020-05-08 RX ORDER — VECURONIUM BROMIDE 1 MG/ML
INJECTION, POWDER, LYOPHILIZED, FOR SOLUTION INTRAVENOUS PRN
Status: DISCONTINUED | OUTPATIENT
Start: 2020-05-08 | End: 2020-05-08

## 2020-05-08 RX ORDER — DIPHENHYDRAMINE HYDROCHLORIDE 50 MG/ML
25 INJECTION INTRAMUSCULAR; INTRAVENOUS EVERY 6 HOURS PRN
Status: DISCONTINUED | OUTPATIENT
Start: 2020-05-08 | End: 2020-05-09 | Stop reason: HOSPADM

## 2020-05-08 RX ORDER — FENTANYL CITRATE 50 UG/ML
INJECTION, SOLUTION INTRAMUSCULAR; INTRAVENOUS PRN
Status: DISCONTINUED | OUTPATIENT
Start: 2020-05-08 | End: 2020-05-08

## 2020-05-08 RX ORDER — OXYCODONE HYDROCHLORIDE 5 MG/1
5 TABLET ORAL EVERY 4 HOURS PRN
Qty: 12 TABLET | Refills: 0 | Status: SHIPPED | OUTPATIENT
Start: 2020-05-08 | End: 2020-05-21

## 2020-05-08 RX ADMIN — ONDANSETRON 4 MG: 2 INJECTION INTRAMUSCULAR; INTRAVENOUS at 15:10

## 2020-05-08 RX ADMIN — PHENYLEPHRINE HYDROCHLORIDE 100 MCG: 10 INJECTION INTRAVENOUS at 08:20

## 2020-05-08 RX ADMIN — PHENYLEPHRINE HYDROCHLORIDE 100 MCG: 10 INJECTION INTRAVENOUS at 08:26

## 2020-05-08 RX ADMIN — GLYCOPYRROLATE 0.3 MG: 0.2 INJECTION, SOLUTION INTRAMUSCULAR; INTRAVENOUS at 10:39

## 2020-05-08 RX ADMIN — ACETAMINOPHEN 975 MG: 325 TABLET, FILM COATED ORAL at 06:20

## 2020-05-08 RX ADMIN — SODIUM CHLORIDE, POTASSIUM CHLORIDE, SODIUM LACTATE AND CALCIUM CHLORIDE: 600; 310; 30; 20 INJECTION, SOLUTION INTRAVENOUS at 23:37

## 2020-05-08 RX ADMIN — VECURONIUM BROMIDE 1 MG: 1 INJECTION, POWDER, LYOPHILIZED, FOR SOLUTION INTRAVENOUS at 10:05

## 2020-05-08 RX ADMIN — PHENYLEPHRINE HYDROCHLORIDE 100 MCG: 10 INJECTION INTRAVENOUS at 08:06

## 2020-05-08 RX ADMIN — PHENYLEPHRINE HYDROCHLORIDE 100 MCG: 10 INJECTION INTRAVENOUS at 07:52

## 2020-05-08 RX ADMIN — HYDROMORPHONE HYDROCHLORIDE 0.5 MG: 1 INJECTION, SOLUTION INTRAMUSCULAR; INTRAVENOUS; SUBCUTANEOUS at 11:46

## 2020-05-08 RX ADMIN — ACETAMINOPHEN 975 MG: 325 TABLET, FILM COATED ORAL at 15:01

## 2020-05-08 RX ADMIN — PHENYLEPHRINE HYDROCHLORIDE 100 MCG: 10 INJECTION INTRAVENOUS at 08:00

## 2020-05-08 RX ADMIN — SCOPALAMINE 1 PATCH: 1 PATCH, EXTENDED RELEASE TRANSDERMAL at 17:10

## 2020-05-08 RX ADMIN — KETOROLAC TROMETHAMINE 15 MG: 30 INJECTION, SOLUTION INTRAMUSCULAR at 10:32

## 2020-05-08 RX ADMIN — HYDROMORPHONE HYDROCHLORIDE 0.5 MG: 1 INJECTION, SOLUTION INTRAMUSCULAR; INTRAVENOUS; SUBCUTANEOUS at 10:20

## 2020-05-08 RX ADMIN — HYDROMORPHONE HYDROCHLORIDE 0.5 MG: 1 INJECTION, SOLUTION INTRAMUSCULAR; INTRAVENOUS; SUBCUTANEOUS at 11:56

## 2020-05-08 RX ADMIN — ONDANSETRON 4 MG: 2 INJECTION INTRAMUSCULAR; INTRAVENOUS at 10:27

## 2020-05-08 RX ADMIN — Medication 5 MG: at 10:31

## 2020-05-08 RX ADMIN — NEOSTIGMINE METHYLSULFATE 2 MG: 1 INJECTION, SOLUTION INTRAVENOUS at 10:39

## 2020-05-08 RX ADMIN — SODIUM CHLORIDE, POTASSIUM CHLORIDE, SODIUM LACTATE AND CALCIUM CHLORIDE: 600; 310; 30; 20 INJECTION, SOLUTION INTRAVENOUS at 13:33

## 2020-05-08 RX ADMIN — Medication 5 MG: at 09:22

## 2020-05-08 RX ADMIN — PROPOFOL 150 MCG/KG/MIN: 10 INJECTION, EMULSION INTRAVENOUS at 07:39

## 2020-05-08 RX ADMIN — PROCHLORPERAZINE EDISYLATE 10 MG: 5 INJECTION INTRAMUSCULAR; INTRAVENOUS at 20:14

## 2020-05-08 RX ADMIN — SODIUM CHLORIDE, POTASSIUM CHLORIDE, SODIUM LACTATE AND CALCIUM CHLORIDE: 600; 310; 30; 20 INJECTION, SOLUTION INTRAVENOUS at 08:10

## 2020-05-08 RX ADMIN — Medication 5 MG: at 08:25

## 2020-05-08 RX ADMIN — VECURONIUM BROMIDE 2 MG: 1 INJECTION, POWDER, LYOPHILIZED, FOR SOLUTION INTRAVENOUS at 08:33

## 2020-05-08 RX ADMIN — PHENYLEPHRINE HYDROCHLORIDE 100 MCG: 10 INJECTION INTRAVENOUS at 10:30

## 2020-05-08 RX ADMIN — LATANOPROST 1 DROP: 50 SOLUTION/ DROPS OPHTHALMIC at 20:23

## 2020-05-08 RX ADMIN — FENTANYL CITRATE 50 MCG: 50 INJECTION, SOLUTION INTRAMUSCULAR; INTRAVENOUS at 08:34

## 2020-05-08 RX ADMIN — MIDAZOLAM 2 MG: 1 INJECTION INTRAMUSCULAR; INTRAVENOUS at 07:35

## 2020-05-08 RX ADMIN — GLYCOPYRROLATE 0.3 MG: 0.2 INJECTION, SOLUTION INTRAMUSCULAR; INTRAVENOUS at 10:37

## 2020-05-08 RX ADMIN — CEFAZOLIN SODIUM 1 G: 2 INJECTION, SOLUTION INTRAVENOUS at 10:04

## 2020-05-08 RX ADMIN — ROCURONIUM BROMIDE 50 MG: 10 INJECTION INTRAVENOUS at 07:40

## 2020-05-08 RX ADMIN — DEXAMETHASONE SODIUM PHOSPHATE 4 MG: 4 INJECTION, SOLUTION INTRA-ARTICULAR; INTRALESIONAL; INTRAMUSCULAR; INTRAVENOUS; SOFT TISSUE at 08:05

## 2020-05-08 RX ADMIN — PHENYLEPHRINE HYDROCHLORIDE 100 MCG: 10 INJECTION INTRAVENOUS at 07:56

## 2020-05-08 RX ADMIN — PROCHLORPERAZINE EDISYLATE 10 MG: 5 INJECTION INTRAMUSCULAR; INTRAVENOUS at 13:32

## 2020-05-08 RX ADMIN — ONDANSETRON 4 MG: 2 INJECTION INTRAMUSCULAR; INTRAVENOUS at 12:13

## 2020-05-08 RX ADMIN — SODIUM CHLORIDE, POTASSIUM CHLORIDE, SODIUM LACTATE AND CALCIUM CHLORIDE: 600; 310; 30; 20 INJECTION, SOLUTION INTRAVENOUS at 06:20

## 2020-05-08 RX ADMIN — GLYCOPYRROLATE 0.2 MG: 0.2 INJECTION, SOLUTION INTRAMUSCULAR; INTRAVENOUS at 09:01

## 2020-05-08 RX ADMIN — VECURONIUM BROMIDE 2 MG: 1 INJECTION, POWDER, LYOPHILIZED, FOR SOLUTION INTRAVENOUS at 08:13

## 2020-05-08 RX ADMIN — CEFAZOLIN SODIUM 2 G: 2 INJECTION, SOLUTION INTRAVENOUS at 08:05

## 2020-05-08 RX ADMIN — FENTANYL CITRATE 50 MCG: 50 INJECTION, SOLUTION INTRAMUSCULAR; INTRAVENOUS at 07:40

## 2020-05-08 RX ADMIN — LIDOCAINE HYDROCHLORIDE 100 MG: 20 INJECTION, SOLUTION INFILTRATION; PERINEURAL at 07:40

## 2020-05-08 RX ADMIN — NEOSTIGMINE METHYLSULFATE 2 MG: 1 INJECTION, SOLUTION INTRAVENOUS at 10:37

## 2020-05-08 ASSESSMENT — MIFFLIN-ST. JEOR: SCORE: 1410.03

## 2020-05-08 ASSESSMENT — LIFESTYLE VARIABLES: TOBACCO_USE: 0

## 2020-05-08 ASSESSMENT — ACTIVITIES OF DAILY LIVING (ADL)
ADLS_ACUITY_SCORE: 13
ADLS_ACUITY_SCORE: 13

## 2020-05-08 NOTE — ANESTHESIA CARE TRANSFER NOTE
Patient: Alma Mata    Procedure(s):  ROBOTIC ASSISTED RECTOPEXY WITH MESH AND PROCTOSCOPY    Diagnosis: Rectal prolapse [K62.3]  Diagnosis Additional Information: No value filed.    Anesthesia Type:   General     Note:  Airway :Face Mask  Patient transferred to:PACU  Comments: Neuromuscular blockade reversed after TOF 4/4, spontaneous respirations, adequate tidal volumes, followed commands to voice, oropharynx suctioned with soft flexible catheter, extubated atraumatically, extubated with suction.  Oxygen via facemask at 6 liters per minute to PACU. After extubation, patient remained in OR for 14 minutes until transport to PACU due to standard hospital COVID-19 precautions, Oxygen tubing connected to wall O2 in PACU, SpO2, NiBP, and EKG monitors and alarms on and functioning, Suma Hugger warmer connected to patient gown.   Handoff Report: Identifed the Patient, Identified the Reponsible Provider, Reviewed the pertinent medical history, Discussed the surgical course, Reviewed Intra-OP anesthesia mangement and issues during anesthesia, Set expectations for post-procedure period and Allowed opportunity for questions and acknowledgement of understanding      Vitals: (Last set prior to Anesthesia Care Transfer)    CRNA VITALS  5/8/2020 1041 - 5/8/2020 1117      5/8/2020             Pulse:  65    SpO2:  100 %    Resp Rate (set):  10                Electronically Signed By: ABHAY Benson CRNA  May 8, 2020  11:17 AM

## 2020-05-08 NOTE — PROVIDER NOTIFICATION
Notified provider d/t dry heaving and emesis x2 not relieved by antiemetics. Scopolamine patch ordered per Fabiola Chacon PA-C.

## 2020-05-08 NOTE — PROGRESS NOTES
Brief chart update    Patient is POD#0 robotic redo ventral rectopexy with mesh. Nauseous and emesis x2 this afternoon. Zofran and compazine given with minimal relief. Minimal abdominal pain. -flatus, -BM.     Plan: NPO tonight. Add scopolamine patch. Will trial diet in morning if nausea improves.     Fabiola Chacon PA-C on 5/8/2020 at 4:54 PM

## 2020-05-08 NOTE — OR NURSING
Call out to surgeon to clarify if wells cath out in PACU- still waiting for call back- report given to Alison CHAMORRO

## 2020-05-08 NOTE — ANESTHESIA PREPROCEDURE EVALUATION
Anesthesia Pre-Procedure Evaluation    Patient: Alma Mata   MRN: [id number] : 1960          Preoperative Diagnosis: Rectal prolapse [K62.3]    Procedure(s):  ROBOTIC ASSISTED RECTOPEXY WITH MESH AND PROCTOSCOPY    Past Medical History:   Diagnosis Date     Arthritis     slight arthritis in right hand     CARDIOVASCULAR SCREENING; LDL GOAL LESS THAN 160 2/10/2010     Motion sickness      PONV (postoperative nausea and vomiting)      Subclinical hyperthyroidism      Past Surgical History:   Procedure Laterality Date     COLONOSCOPY  2009     GI SURGERY  2009    Surgery for prolapsed rectum     SURGICAL HISTORY OF -   2003    Left thumb surgery after laceration       Anesthesia Evaluation     . Pt has had prior anesthetic.     History of anesthetic complications   - PONV        ROS/MED HX    ENT/Pulmonary:  - neg pulmonary ROS   (+)other ENT- motion sickness, , . .   (-) tobacco use and sleep apnea   Neurologic:  - neg neurologic ROS     Cardiovascular:  - neg cardiovascular ROS      (-) hypertension   METS/Exercise Tolerance:     Hematologic:  - neg hematologic  ROS       Musculoskeletal:   (+) arthritis,  -       GI/Hepatic:  - neg GI/hepatic ROS      (-) GERD   Renal/Genitourinary:  - ROS Renal section negative       Endo:     (+) thyroid problem  hyperthyroidism, .      Psychiatric:         Infectious Disease:         Malignancy:         Other:                          Physical Exam  Normal systems: cardiovascular, pulmonary and dental    Airway   Mallampati: I  TM distance: >3 FB  Neck ROM: full    Dental     Cardiovascular       Pulmonary             Lab Results   Component Value Date    WBC 4.5 2020    HGB 11.8 2020    HCT 37.3 2020     2020     2016    POTASSIUM 4.0 2016    CHLORIDE 106 2016    CO2 27 2016    BUN 13 2016    CR 0.75 2016    GLC 87 2016    JAMAR 9.7 2016    ALBUMIN 3.7 2016     "PROTTOTAL 7.1 11/17/2016    ALT 26 11/17/2016    AST 16 11/17/2016    ALKPHOS 65 11/17/2016    BILITOTAL 0.4 11/17/2016    TSH 0.63 11/17/2016    T4 1.18 04/21/2011    T3 97 04/21/2011       Preop Vitals  BP Readings from Last 3 Encounters:   05/08/20 125/86   05/05/20 110/68   03/10/20 128/82    Pulse Readings from Last 3 Encounters:   05/08/20 77   05/05/20 91   03/10/20 83      Resp Readings from Last 3 Encounters:   05/08/20 18   08/12/07 16    SpO2 Readings from Last 3 Encounters:   05/08/20 97%   05/05/20 97%   03/10/20 97%      Temp Readings from Last 1 Encounters:   05/08/20 36.9  C (98.4  F) (Temporal)    Ht Readings from Last 1 Encounters:   05/08/20 1.753 m (5' 9\")      Wt Readings from Last 1 Encounters:   05/08/20 77.6 kg (171 lb)    Estimated body mass index is 25.25 kg/m  as calculated from the following:    Height as of this encounter: 1.753 m (5' 9\").    Weight as of this encounter: 77.6 kg (171 lb).       Anesthesia Plan      History & Physical Review  History and physical reviewed and following examination; no interval change.propofol gtt    ASA Status:  2 .    NPO Status:  > 8 hours    Plan for General with Intravenous and Propofol induction. Maintenance will be TIVA.    PONV prophylaxis:  Ondansetron (or other 5HT-3) and Dexamethasone or Solumedrol         Postoperative Care  Postoperative pain management:  Multi-modal analgesia.      Consents  Anesthetic plan, risks, benefits and alternatives discussed with:  Patient..                 Karine Rodriguez MD  "

## 2020-05-08 NOTE — OR NURSING
OK to transfer to [facility] [address] per Dr. Rodriguez.   Report given to [facility] [address] RN,still awaiting call back for wells catheter removal from Fabiola HARRELL.

## 2020-05-08 NOTE — PLAN OF CARE
A/O x4. AVSS on RA. Up w/ 1, gait belt. Pain managed w/ scheduled Tylenol. Port sites x5, liquid bandage. CMS intact. Nausea managed w/ PRN zofran and compazine. Minimal relief w/ zofran. Scope patch placed behind R ear. Hypo/ faint BS, - flatus. NPO per MD d/t nausea; small amount of ice chips ok. Voiding.

## 2020-05-08 NOTE — ANESTHESIA POSTPROCEDURE EVALUATION
Patient: Alma Mata    Procedure(s):  ROBOTIC ASSISTED RECTOPEXY WITH MESH AND PROCTOSCOPY    Diagnosis:Rectal prolapse [K62.3]  Diagnosis Additional Information: No value filed.    Anesthesia Type:  General    Note:  Anesthesia Post Evaluation    Patient location during evaluation: PACU  Patient participation: Able to fully participate in evaluation  Level of consciousness: awake and alert  Pain management: adequate  Airway patency: patent  Cardiovascular status: acceptable  Respiratory status: acceptable and unassisted  Hydration status: acceptable  PONV: none             Last vitals:  Vitals:    05/08/20 0607   BP: 125/86   Pulse: 77   Resp: 18   Temp: 36.9  C (98.4  F)   SpO2: 97%         Electronically Signed By: Karine Rodriguez MD  May 8, 2020  11:20 AM

## 2020-05-08 NOTE — OP NOTE
Procedure Date: 05/08/2020      PREOPERATIVE DIAGNOSIS:  Recurrent rectal prolapse.      POSTOPERATIVE DIAGNOSIS:  Recurrent rectal prolapse.        PROCEDURE:  Robotic ventral rectopexy with mesh.      SURGEON:  Jo Ann Kothari MD      ASSISTANTS:  aPola Caldwell MD and Fabiola Chacon PA-C      ANESTHESIA:  General.      ESTIMATED BLOOD LOSS:  10 mL.      FINDINGS:  The patient's previous suture rectopexy sutures were seen but were not attached to the sacral fascia.      INDICATIONS:  The patient is a very pleasant 59-year-old female who approximately 10 years ago had a laparoscopic rectopexy but unfortunately had recurrence on postop day #1 and then had a repeat procedure on postoperative day #1 done through a Pfannenstiel incision.  She did quite well but over the past 6 months has developed a recurrent rectal prolapse.  A defecography does show good vaginal apical support, possibly an anterior lead point, but no obvious enterocele.  I discussed with the patient another trial of a minimally invasive approach.  We discussed the use of mesh and how this is different.  We discussed mesh complications are currently under 3% of patients when placed intraabdominally.  We discussed the procedure, the recovery, the risks and benefits to the patient.  The patient understands and agrees to proceed.      PROCEDURE:  The patient was brought into the operating room.  She was placed under general anesthesia.  She was placed in lithotomy position on a pink pad with both arms tucked.  The abdomen was prepped and draped in the usual sterile manner.  After a pause for the cause was performed, a supraumbilical incision was made.  A Veress needle was easily placed in the abdomen and confirmed by drop test.  Insufflation was performed through the Veress needle, and then an 8 mm robotic Xi trocar was placed.  Generalized laparoscopy did not reveal any contraindications to minimally invasive surgery, nor did it reveal any injury from Veress or  trocar placement.  A transabdominal plane block was then performed by identifying the anterior axillary line on both left and right side and then 2 cm cephalad and caudad to the line in the preperitoneal space.  Then 7.5 mL of 0.25% Marcaine were injected in each of the 4 quadrants for a total of 30 mL bilateral block.  We did transverse port placement, so approximately 7 cm lateral to the umbilical incision, ports were placed, then 1 further lateral on the left side, and a 5 mm AirSeal was placed in the right side.  The patient was put in steep Trendelenburg.  The sacral promontory was identified.  The robot was docked.  Of note, prior to docking, we did notice some adhesions on the right side of the anterior abdominal wall by the cecum.  This was taken down, so we would not injure the cecum with placing instruments into the abdomen.  The sacral promontory dissection went fairly easily.  This was scored.  The sacral promontory was identified.  I then got into the presacral space, so we could find the sutures from the previous rectopexy.  I carried this down posteriorly, mobilizing this fully and then also laterally with good care to see the left ureter and keep it out of harm's way.  The anterior dissection was a bit trickier.  I did take a little bit of the serosa of the vaginal wall at the upper aspect but was able to find the correct plane and dissect this down all the way down to the pelvic floor.  I also took the right-sided attachments down to the pelvic floor.  Assessing this from below, I felt that I was quite distal in the mobilization.  A piece of Coloplast sacrocolpopexy mesh was then cut, so it was 5 cm wide with a 6 cm length.  It had a 2 cm with ultimate tail.  This was secured distally to the rectum with a single 2-0 Ethibond suture and then an additional eight or nine 3-0 PDS sutures to put the head of the mesh on to the distal rectum.  I then pulled this up to the adequate right tension and near  the vaginal apex did 2 separate 3-0 PDS sutures to bring up the vaginal apex as well    for a posterior colpopexy.  I then went to the sacral promontory and secured the mesh at the appropriate tension with 2 separate 0 Ethibond sutures.  I went below, examined the patient, and felt that this was adequate tension.  The repair was then reperitonealized using running 3-0 Vicryl.  Once this was done, I went below.  I performed a rigid proctoscopy and air leak test.  There was no evidence of injury.  There is some redundant mucosa in the rectum but nothing that was prolapsing out.  No sutures were seen.  At this point, we felt good about the repair.  The instruments were then removed.  The robot was then undocked.  The ports were removed.  Closure of each skin incision was then reapproximated with 4-0 Monocryl in a subcuticular manner.  The wounds were cleaned and dressed with Dermabond.  The patient tolerated the procedure well with minimal blood loss and without intraoperative complication.  She went to recovery in stable condition.  Sponge and instrument counts were correct x 3 at the end of the procedure.         JO ANN MCMAHAN MD             D: 2020   T: 2020   MT:       Name:     SHERRIE WILCOX   MRN:      [id number]        Account:        [id number]   :      1960           Procedure Date: 2020      Document: [id number]       cc: Jo Ann BLANK CNP

## 2020-05-08 NOTE — BRIEF OP NOTE
[facility]    Brief Operative Note    Pre-operative diagnosis: Rectal prolapse [K62.3]  Post-operative diagnosis Same as pre-operative diagnosis    Procedure: Procedure(s):  ROBOTIC ASSISTED RECTOPEXY WITH MESH AND PROCTOSCOPY  Surgeon: Surgeon(s) and Role:     * Jo Ann Kothari MD - Primary     * Fabiola Chacon PA-C - Assisting     * Lucy Caldwell MD - Assisting  Anesthesia: General   Estimated blood loss: Less than 10 ml  Drains: None  Specimens: * No specimens in log *  Findings:   None.  Complications: None.  Implants:   Implant Name Type Inv. Item Serial No.  Lot No. LRB No. Used Action   MESH SLING FLAT RESTORELLE [id number] Mesh MESH SLING FLAT RESTORELLE [id number]  COLOPLAST [id number] N/A 1 Implanted       Condition on discharge from OR: Satisfactory    Fabiola Chacon PA-C   [facility]   [phone].        ADDENDUM:    PATIENT DATA  Indicate Y or N:  Home O2 No  Hemodialysis  No  Transplant patient  No  Cirrhosis  No  Steroids in last 30 days  No  Immunomodulators in last 30 days  No  Anticoagulation at time of surgery  No   List medication n/a  Prior abdominal surgery  Yes  Pelvic irradiation  No    Albumin within 30 days if known   Hgb within 30 days if known    Hemoglobin   Date Value Ref Range Status   05/05/2020 11.8 11.7 - 15.7 g/dL Final   ]  Cr within 30 days if known    Creatinine   Date Value Ref Range Status   11/17/2016 0.75 0.52 - 1.04 mg/dL Final   ]  Body mass index is 25.25 kg/m .      OR DATA  Emergent  No   <24 hours  No   <1 week  No  Bowel Prep Yes  Antibiotics  Yes  DVT prophylaxis    Heparin  No   SCD  Yes   None  No  Drain  No  ASA (1,2,3,4) 2  OR time (min) 125  Stents  No  Transfuse >/= 2U  No  Anastomosis   Stapled  No   Handsewn  No  Leak Test    Positive  No   Negative  No   Not done  Yes

## 2020-05-09 VITALS
RESPIRATION RATE: 16 BRPM | OXYGEN SATURATION: 98 % | HEIGHT: 69 IN | HEART RATE: 80 BPM | SYSTOLIC BLOOD PRESSURE: 110 MMHG | WEIGHT: 171 LBS | BODY MASS INDEX: 25.33 KG/M2 | TEMPERATURE: 98 F | DIASTOLIC BLOOD PRESSURE: 70 MMHG

## 2020-05-09 LAB
ANION GAP SERPL CALCULATED.3IONS-SCNC: 7 MMOL/L (ref 3–14)
BUN SERPL-MCNC: 7 MG/DL (ref 7–30)
CALCIUM SERPL-MCNC: 8.2 MG/DL (ref 8.5–10.1)
CHLORIDE SERPL-SCNC: 112 MMOL/L (ref 94–109)
CO2 SERPL-SCNC: 26 MMOL/L (ref 20–32)
CREAT SERPL-MCNC: 0.66 MG/DL (ref 0.52–1.04)
ERYTHROCYTE [DISTWIDTH] IN BLOOD BY AUTOMATED COUNT: 15.6 % (ref 10–15)
GFR SERPL CREATININE-BSD FRML MDRD: >90 ML/MIN/{1.73_M2}
GLUCOSE SERPL-MCNC: 80 MG/DL (ref 70–99)
HCT VFR BLD AUTO: 32.5 % (ref 35–47)
HGB BLD-MCNC: 10.3 G/DL (ref 11.7–15.7)
MAGNESIUM SERPL-MCNC: 2 MG/DL (ref 1.6–2.3)
MCH RBC QN AUTO: 25 PG (ref 26.5–33)
MCHC RBC AUTO-ENTMCNC: 31.7 G/DL (ref 31.5–36.5)
MCV RBC AUTO: 79 FL (ref 78–100)
PHOSPHATE SERPL-MCNC: 2.8 MG/DL (ref 2.5–4.5)
PLATELET # BLD AUTO: 257 10E9/L (ref 150–450)
POTASSIUM SERPL-SCNC: 3.7 MMOL/L (ref 3.4–5.3)
RBC # BLD AUTO: 4.12 10E12/L (ref 3.8–5.2)
SODIUM SERPL-SCNC: 145 MMOL/L (ref 133–144)
WBC # BLD AUTO: 7.1 10E9/L (ref 4–11)

## 2020-05-09 PROCEDURE — 36415 COLL VENOUS BLD VENIPUNCTURE: CPT | Performed by: COLON & RECTAL SURGERY

## 2020-05-09 PROCEDURE — 85027 COMPLETE CBC AUTOMATED: CPT | Performed by: COLON & RECTAL SURGERY

## 2020-05-09 PROCEDURE — 84100 ASSAY OF PHOSPHORUS: CPT | Performed by: COLON & RECTAL SURGERY

## 2020-05-09 PROCEDURE — 80048 BASIC METABOLIC PNL TOTAL CA: CPT | Performed by: COLON & RECTAL SURGERY

## 2020-05-09 PROCEDURE — 83735 ASSAY OF MAGNESIUM: CPT | Performed by: COLON & RECTAL SURGERY

## 2020-05-09 RX ADMIN — TIMOLOL MALEATE 1 DROP: 5 SOLUTION/ DROPS OPHTHALMIC at 09:00

## 2020-05-09 ASSESSMENT — ACTIVITIES OF DAILY LIVING (ADL)
ADLS_ACUITY_SCORE: 13

## 2020-05-09 NOTE — PROGRESS NOTES
[facility]on [facility] Rect[facility] Progress Note          Assessment and Plan:   POD #1 ventral rectopexy    Doing well  OK to go home later today so long as she tolerates liquids    Ace Mckeon MD  Colorectal Surgery  [phone] (office)  [phone] (pager)  [email or web address]             Interval History:   Passing gas.  No bm.  Urinating.  Tolerated some water this am.  No vomiting              Physical Exam:   Vitals were reviewed  Patient Vitals for the past 24 hrs:   BP Temp Temp src Pulse Heart Rate Resp SpO2   05/09/20 0300 100/63 98.6  F (37  C) Oral 91 -- 16 97 %   05/08/20 2300 108/58 98.2  F (36.8  C) Oral 81 -- 16 96 %   05/08/20 1921 113/71 97.1  F (36.2  C) Oral 85 84 16 96 %   05/08/20 1615 113/72 -- -- 57 54 -- 96 %   05/08/20 1515 102/66 96  F (35.6  C) Oral 54 52 12 97 %   05/08/20 1415 93/57 -- -- 54 58 20 97 %   05/08/20 1345 93/59 -- -- (!) 49 51 -- 93 %   05/08/20 1315 104/64 -- -- 58 54 13 96 %   05/08/20 1300 93/64 -- -- (!) 45 (!) 45 11 97 %   05/08/20 1250 91/66 -- -- (!) 44 (!) 44 13 97 %   05/08/20 1240 99/71 -- -- (!) 43 (!) 46 11 97 %   05/08/20 1230 97/68 -- -- (!) 43 (!) 49 15 97 %   05/08/20 1220 96/68 -- -- (!) 44 (!) 46 12 97 %   05/08/20 1210 106/73 96.6  F (35.9  C) Temporal (!) 45 52 13 96 %   05/08/20 1200 98/72 -- -- (!) 44 (!) 44 11 97 %   05/08/20 1150 112/76 -- -- 52 55 11 98 %   05/08/20 1140 105/71 -- -- 50 (!) 49 16 100 %   05/08/20 1130 104/74 -- -- 58 57 14 100 %   05/08/20 1120 106/74 -- -- 60 59 15 100 %   05/08/20 1114 111/72 96.3  F (35.7  C) Temporal 65 66 16 100 %         Intake/Output Summary (Last 24 hours) at 5/9/2020 0723  Last data filed at 5/8/2020 1556  Gross per 24 hour   Intake 1600 ml   Output 310 ml   Net 1290 ml       Head - Nomocephalic atraumatic  Sclera anicteric  Extremities warm and well perfused  Lungs - breathing non labored,   Abdomen - soft, non distended, wounds look good  Rectal exam - deferred  Skin - no rash  Psych - affect appropriate  Neuro -  no focal deficits             Data:   All laboratory and imaging data in the past 24 hours reviewed    CBC  Lab Results   Component Value Date    WBC 7.1 05/09/2020    WBC 4.5 05/05/2020    WBC 5.0 11/17/2016    HGB 10.3 (L) 05/09/2020    HGB 11.8 05/05/2020    HGB 12.2 11/17/2016    HCT 32.5 (L) 05/09/2020    HCT 37.3 05/05/2020    HCT 38.4 11/17/2016     05/09/2020     05/05/2020     11/17/2016       BMP  Recent Labs   Lab Test 05/09/20  0639 11/17/16  0830   * 142   POTASSIUM 3.7 4.0   CHLORIDE 112* 106   CO2 26 27   ANIONGAP 7 9   GLC 80 87   BUN 7 13   CR 0.66 0.75   JAMAR 8.2* 9.7       Liver Function Studies -   Recent Labs   Lab Test 11/17/16  0830   PROTTOTAL 7.1   ALBUMIN 3.7   BILITOTAL 0.4   ALKPHOS 65   AST 16   ALT 26                      Medications:     Current Facility-Administered Medications Ordered in Epic   Medication Dose Route Frequency Last Rate Last Dose     acetaminophen (TYLENOL) tablet 975 mg  975 mg Oral Q8H   975 mg at 05/08/20 1501     benzocaine-menthol (CHLORASEPTIC) 6-10 MG lozenge 1 lozenge  1 lozenge Buccal Q1H PRN         ceFAZolin (ANCEF) 1 g vial to attach to  ml bag for ADULT or 50 ml bag for PEDS  1 g Intravenous See Admin Instructions         diphenhydrAMINE (BENADRYL) injection 25 mg  25 mg Intravenous Q6H PRN         fluticasone (FLONASE) 50 MCG/ACT spray 1 spray  1 spray Both Nostrils Daily         HYDROmorphone (PF) (DILAUDID) injection 0.3-0.5 mg  0.3-0.5 mg Intravenous Q4H PRN         ketorolac (TORADOL) injection 15 mg  15 mg Intravenous Q6H PRN         lactated ringers BOLUS 500 mL  500 mL Intravenous Q4H PRN         latanoprost (XALATAN) 0.005 % ophthalmic solution 1 drop  1 drop Both Eyes QPM   1 drop at 05/08/20 2023     lidocaine (LMX4) cream   Topical Q1H PRN         lidocaine 1 % 0.1-1 mL  0.1-1 mL Other Q1H PRN         lidocaine 1 % 0.1-1 mL  0.1-1 mL Other Q1H PRN         naloxone (NARCAN) injection 0.1-0.4 mg  0.1-0.4 mg  Intravenous Q2 Min PRN         naloxone (NARCAN) injection 0.1-0.4 mg  0.1-0.4 mg Intravenous Q2 Min PRN         ondansetron (ZOFRAN) injection 4 mg  4 mg Intravenous Q6H PRN   4 mg at 05/08/20 1510     oxyCODONE (ROXICODONE) tablet 5 mg  5 mg Oral Q4H PRN         prochlorperazine (COMPAZINE) injection 10 mg  10 mg Intravenous Q6H PRN   10 mg at 05/08/20 2014     scopolamine (TRANSDERM) 72 hr patch 1 patch  1 patch Transdermal Q72H   1 patch at 05/08/20 1710    And     scopolamine (TRANSDERM-SCOP) Patch in Place   Transdermal Q8H         sodium chloride (PF) 0.9% PF flush 3 mL  3 mL Intracatheter q1 min prn         sodium chloride (PF) 0.9% PF flush 3 mL  3 mL Intracatheter Q8H   3 mL at 05/08/20 2018     timolol maleate (TIMOPTIC) 0.5 % ophthalmic solution 1 drop  1 drop Both Eyes QAM         Current Outpatient Medications Ordered in Epic   Medication     oxyCODONE (ROXICODONE) 5 MG tablet

## 2020-05-09 NOTE — PLAN OF CARE
A&Ox4.  AVSS, on RA.  Denies need for pain med.  IVF SL this morning.  Tolerating oral intake, full liquid.  Passed gas and had small liquid BM.  Voiding w/o difficulty.  Lap sites with liquid bandage, CDI, ANA ROSA.  Prescription filled and given to patient together with discharge instructions, verbalized understanding.  PIV removed.  Wheeled out to door 2 with her belongings.

## 2020-05-09 NOTE — PLAN OF CARE
VSS on RA. Pt refused capnography. A/Ox 4. Incisions ANA ROSA some bruising. CMS intact. Pt denied pain. Up SBA. Pt tolerating ice chips but not trying anything else. Intermittent N&V, gave Compazine and pt thought this helped. -gas, no bm. Voiding adequate. LS clear. Will continue to monitor.

## 2020-05-11 ENCOUNTER — TELEPHONE (OUTPATIENT)
Dept: FAMILY MEDICINE | Facility: CLINIC | Age: 60
End: 2020-05-11

## 2020-05-11 NOTE — TELEPHONE ENCOUNTER
Patient discharged from [facility] for inpatient hospital stay on 5/9 for rectal prolapse.    Please contact patient to follow up; no appointment scheduled at this time.    ER / IP:  0/1    Care Coordination:  alfonso Mcallister

## 2020-05-11 NOTE — TELEPHONE ENCOUNTER
No discharge notes or instructions in Epic at this time. .    ED / Discharge Outreach Protocol    Patient Contact    Attempt # 1    Was call answered?  No.  Unable to leave message. Recording says party calling is unavailable, and then get busy signal.     Rowan Saeed R.N.

## 2020-05-12 ENCOUNTER — DOCUMENTATION ONLY (OUTPATIENT)
Dept: OTHER | Facility: CLINIC | Age: 60
End: 2020-05-12

## 2020-05-18 NOTE — TELEPHONE ENCOUNTER
ED / Discharge Outreach Protocol    Patient Contact    Attempt # 2    Was call answered?  No.  Unable to leave message.    HARPAL TrujilloN, RN  [facility]

## 2020-05-21 NOTE — TELEPHONE ENCOUNTER
"Hospital/TCU/ED for chronic condition Discharge Protocol    \"Hi, my name is Yecenia Farfan RN, a registered nurse, and I am calling from [facility].  I am calling to follow up and see how things are going for you after your recent emergency visit/hospital/TCU stay.\"    Tell me how you are doing now that you are home?\" great- surgery was great and turned      Discharge Instructions    \"Let's review your discharge instructions.  What is/are the follow-up recommendations?  Pt. Response: f/u with [facility]    \"Has an appointment with your primary care provider been scheduled?\"   No (schedule appointment)    \"When you see the provider, I would recommend that you bring your medications with you.\"    Medications    \"Tell me what changed about your medicines when you discharged?\"    Changes to chronic meds?    0-1    \"What questions do you have about your medications?\"    None     New diagnoses of heart failure, COPD, diabetes, or MI?    No              Post Discharge Medication Reconciliation Status: discharge medications reconciled, continue medications without change.     Was MTM referral placed (*Make sure to put transitions as reason for referral)?   No    Call Summary    \"What questions or concerns do you have about your recent visit and your follow-up care?\"     none    \"If you have questions or things don't continue to improve, we encourage you contact us through the main clinic number (give number).  Even if the clinic is not open, triage nurses are available 24/7 to help you.     We would like you to know that our clinic has extended hours (provide information).  We also have urgent care (provide details on closest location and hours/contact info)\"      \"Thank you for your time and take care!\"             "

## 2020-05-26 ENCOUNTER — MYC MEDICAL ADVICE (OUTPATIENT)
Dept: FAMILY MEDICINE | Facility: CLINIC | Age: 60
End: 2020-05-26

## 2020-05-26 NOTE — TELEPHONE ENCOUNTER
Please see [place] message. Routing to TC/MA pool to assist patient with this request. Thank you.  HARPAL TrujilloN, RN  [facility]

## 2020-05-27 NOTE — DISCHARGE SUMMARY
[facility] Discharge Summary      Alma Mata MRN# [id number]   Age: 59 year old YOB: 1960     Date of Admission:  5/8/2020  Date of Discharge::  5/9/2020 12:02 PM  Admitting Physician:  Jo Ann Kothari MD  Discharge Physician:  Jo Ann Kothari MD     PCP:  Margaret Parr    Disposition: Patient discharged from [facility] to home in stable condition.        Primary Diagnosis:   Rectal prolapse            Discharge Medications:     Discharge Medication List as of 5/9/2020 11:49 AM      START taking these medications    Details   oxyCODONE (ROXICODONE) 5 MG tablet Take 1 tablet (5 mg) by mouth every 4 hours as needed for moderate to severe pain, Disp-12 tablet,R-0, Local Print         CONTINUE these medications which have NOT CHANGED    Details   ASPIRIN PO Take 81 mg by mouth every evening , Historical      calcium-vitamin D 500-125 MG-UNIT TABS Take 1 tablet by mouth daily , Historical      cetirizine (ZYRTEC) 10 MG tablet Take 10 mg by mouth daily as needed for allergies (seasonal allergies), Historical      diphenhydrAMINE (BENADRYL) 25 MG tablet Take 25 mg by mouth nightly as needed for itching or allergies (allergies), Historical      fluticasone (FLONASE) 50 MCG/ACT nasal spray Spray 1 spray into both nostrils daily, Historical      latanoprost (XALATAN) 0.005 % ophthalmic solution Place 1 drop into both eyes every evening , Historical      MULTI-VITAMIN OR TABS Take 1 tablet by mouth daily , Disp-30,R-0, Historical      pseudoePHEDrine (SUDAFED) 30 MG tablet Take by mouth every 4 hours as needed for congestion (seasonal allergies), Historical      timolol maleate (TIMOPTIC) 0.5 % ophthalmic solution Place 1 drop into both eyes every morning , Historical                    Follow Up, Special Instructions:     Discharge diet: Low residue   Discharge activity: Lifting restricted to 10 pounds   Discharge follow-up: Follow up with Dr. Kothari at previously  scheduled post op visit   Wound care: May get incision wet in shower but do not soak or scrub              Procedures:     Procedure(s): Robotic ventral rectopexy       No other procedures performed during this admission            Consultations:   none          Brief Hospital Summary:     Patient is a 59 year old female who underwent robotic ventral rectopexy on 5/8/20 by Dr. Kothari.   There were no immediate complications during this procedure.    Please refer to the full operative summary for details.  The patient's hospital course was unremarkable.  Pain was controlled on oral pain regimen.  She was tolerating a low fiber diet.  Bowel function had returned prior to discharge.  She recovered as anticipated and experienced no post-operative complications.         Attestation:  I have reviewed today's vital signs, notes, medications, labs and imaging.    Fabiola Chacon PA-C  [facility]          ADDENDUM:  Length of stay: 1 days  Indicate Y or N for the following:  UTI  No  C diff  No  PNA  No  SSI No  DVT No  PE  No  CVA No  MI No  Enterocutaneous fistula  No  Peripheral nerve injury  No  Abscess (not adjacent to anastomosis)  No  Leak No    Treated with:   Antibiotics N/A   Drain  NO   Reoperation  NO  Death within 30 days No  Reintubation  No  Reoperation  No   Procedure n/a

## 2020-10-19 ENCOUNTER — TRANSFERRED RECORDS (OUTPATIENT)
Dept: HEALTH INFORMATION MANAGEMENT | Facility: CLINIC | Age: 60
End: 2020-10-19

## 2020-11-07 ENCOUNTER — HEALTH MAINTENANCE LETTER (OUTPATIENT)
Age: 60
End: 2020-11-07

## 2020-12-10 ENCOUNTER — HOSPITAL ENCOUNTER (OUTPATIENT)
Dept: MAMMOGRAPHY | Facility: CLINIC | Age: 60
Discharge: HOME | End: 2020-12-10
Attending: NURSE PRACTITIONER | Admitting: NURSE PRACTITIONER
Payer: COMMERCIAL

## 2020-12-10 DIAGNOSIS — Z12.39 SCREENING FOR BREAST CANCER: ICD-10-CM

## 2020-12-10 PROCEDURE — 77067 SCR MAMMO BI INCL CAD: CPT

## 2021-09-05 ENCOUNTER — HEALTH MAINTENANCE LETTER (OUTPATIENT)
Age: 61
End: 2021-09-05

## 2021-10-07 ENCOUNTER — TRANSFERRED RECORDS (OUTPATIENT)
Dept: HEALTH INFORMATION MANAGEMENT | Facility: CLINIC | Age: 61
End: 2021-10-07

## 2021-10-07 LAB — HEMOCCULT STL QL IA: NEGATIVE

## 2021-12-26 ENCOUNTER — HEALTH MAINTENANCE LETTER (OUTPATIENT)
Age: 61
End: 2021-12-26

## 2022-10-12 ENCOUNTER — TRANSFERRED RECORDS (OUTPATIENT)
Dept: HEALTH INFORMATION MANAGEMENT | Facility: CLINIC | Age: 62
End: 2022-10-12

## 2022-10-12 LAB — HEMOCCULT STL QL IA: NEGATIVE

## 2022-10-23 ENCOUNTER — HEALTH MAINTENANCE LETTER (OUTPATIENT)
Age: 62
End: 2022-10-23

## 2023-04-02 ENCOUNTER — HEALTH MAINTENANCE LETTER (OUTPATIENT)
Age: 63
End: 2023-04-02

## 2023-04-27 ENCOUNTER — MEDICAL CORRESPONDENCE (OUTPATIENT)
Dept: HEALTH INFORMATION MANAGEMENT | Facility: CLINIC | Age: 63
End: 2023-04-27

## 2023-04-28 ENCOUNTER — MEDICAL CORRESPONDENCE (OUTPATIENT)
Dept: HEALTH INFORMATION MANAGEMENT | Facility: CLINIC | Age: 63
End: 2023-04-28

## 2023-05-01 ENCOUNTER — TRANSCRIBE ORDERS (OUTPATIENT)
Dept: GASTROENTEROLOGY | Facility: CLINIC | Age: 63
End: 2023-05-01
Payer: COMMERCIAL

## 2023-05-01 DIAGNOSIS — Z12.11 ENCOUNTER FOR SCREENING COLONOSCOPY: Primary | ICD-10-CM

## 2023-05-03 ENCOUNTER — TELEPHONE (OUTPATIENT)
Dept: GASTROENTEROLOGY | Facility: CLINIC | Age: 63
End: 2023-05-03
Payer: COMMERCIAL

## 2023-05-03 NOTE — TELEPHONE ENCOUNTER
[place] / [place] Coordinator Name: chris  Return call number: [phone]    Insurance: [facility]  We do not accept [employer] patients.    SCHEDULED PROVIDER:  [facility] PROVIDERS: CONSTANTIN  No orders needed if scheduled with their provider.        ADDITIONAL INFORMATION: chris from [facility] called to schedule appointment 6/27 for 7:20am but time is not available as there is not enough time for procedure. Will maine back to schedule ([facility]                      
128

## 2023-05-04 ENCOUNTER — TELEPHONE (OUTPATIENT)
Dept: GASTROENTEROLOGY | Facility: CLINIC | Age: 63
End: 2023-05-04
Payer: COMMERCIAL

## 2023-05-04 NOTE — TELEPHONE ENCOUNTER
Procedure Scheduled: Lower Endoscopy [Colonoscopy]    Procedure Date: 6/27/2023    Site:[facility]; [address] [place] [address]    Endoscopist: Dr. Caldwell    Ordering Provider: Dr. Caldwell    Scheduled by (per the direction of): Right Fax Received on 5/1/2023

## 2023-05-06 ENCOUNTER — MYC MEDICAL ADVICE (OUTPATIENT)
Dept: FAMILY MEDICINE | Facility: CLINIC | Age: 63
End: 2023-05-06
Payer: COMMERCIAL

## 2023-05-06 DIAGNOSIS — Z13.0 SCREENING FOR DEFICIENCY ANEMIA: Primary | ICD-10-CM

## 2023-05-06 DIAGNOSIS — Z13.220 SCREENING FOR LIPID DISORDERS: ICD-10-CM

## 2023-05-06 DIAGNOSIS — Z13.1 SCREENING FOR DIABETES MELLITUS: ICD-10-CM

## 2023-05-09 NOTE — TELEPHONE ENCOUNTER
Please see my chart message below     Please review and advise     Thank you     Deya Zhou RN, BSN  [place] [facility]

## 2023-05-10 NOTE — TELEPHONE ENCOUNTER
Please review mychart response    Thank you!  Ilana MORENO RN   [facility] Triage

## 2023-06-02 ENCOUNTER — LAB (OUTPATIENT)
Dept: LAB | Facility: CLINIC | Age: 63
End: 2023-06-02
Payer: COMMERCIAL

## 2023-06-02 DIAGNOSIS — Z13.1 SCREENING FOR DIABETES MELLITUS: ICD-10-CM

## 2023-06-02 DIAGNOSIS — Z13.0 SCREENING FOR DEFICIENCY ANEMIA: ICD-10-CM

## 2023-06-02 DIAGNOSIS — Z13.220 SCREENING FOR LIPID DISORDERS: ICD-10-CM

## 2023-06-02 LAB
ALBUMIN SERPL BCG-MCNC: 4.2 G/DL (ref 3.5–5.2)
ALP SERPL-CCNC: 66 U/L (ref 35–104)
ALT SERPL W P-5'-P-CCNC: 35 U/L (ref 10–35)
ANION GAP SERPL CALCULATED.3IONS-SCNC: 9 MMOL/L (ref 7–15)
AST SERPL W P-5'-P-CCNC: 28 U/L (ref 10–35)
BILIRUB SERPL-MCNC: 0.3 MG/DL
BUN SERPL-MCNC: 11.2 MG/DL (ref 8–23)
CALCIUM SERPL-MCNC: 9.7 MG/DL (ref 8.8–10.2)
CHLORIDE SERPL-SCNC: 105 MMOL/L (ref 98–107)
CHOLEST SERPL-MCNC: 233 MG/DL
CREAT SERPL-MCNC: 0.79 MG/DL (ref 0.51–0.95)
DEPRECATED HCO3 PLAS-SCNC: 26 MMOL/L (ref 22–29)
ERYTHROCYTE [DISTWIDTH] IN BLOOD BY AUTOMATED COUNT: 12.8 % (ref 10–15)
FERRITIN SERPL-MCNC: 40 NG/ML (ref 11–328)
GFR SERPL CREATININE-BSD FRML MDRD: 84 ML/MIN/1.73M2
GLUCOSE SERPL-MCNC: 96 MG/DL (ref 70–99)
HCT VFR BLD AUTO: 44.7 % (ref 35–47)
HDLC SERPL-MCNC: 97 MG/DL
HGB BLD-MCNC: 14.6 G/DL (ref 11.7–15.7)
LDLC SERPL CALC-MCNC: 126 MG/DL
MCH RBC QN AUTO: 29.3 PG (ref 26.5–33)
MCHC RBC AUTO-ENTMCNC: 32.7 G/DL (ref 31.5–36.5)
MCV RBC AUTO: 90 FL (ref 78–100)
NONHDLC SERPL-MCNC: 136 MG/DL
PLATELET # BLD AUTO: 239 10E3/UL (ref 150–450)
POTASSIUM SERPL-SCNC: 4.8 MMOL/L (ref 3.4–5.3)
PROT SERPL-MCNC: 6.7 G/DL (ref 6.4–8.3)
RBC # BLD AUTO: 4.99 10E6/UL (ref 3.8–5.2)
SODIUM SERPL-SCNC: 140 MMOL/L (ref 136–145)
TRIGL SERPL-MCNC: 49 MG/DL
WBC # BLD AUTO: 3.8 10E3/UL (ref 4–11)

## 2023-06-02 PROCEDURE — 80053 COMPREHEN METABOLIC PANEL: CPT

## 2023-06-02 PROCEDURE — 82728 ASSAY OF FERRITIN: CPT

## 2023-06-02 PROCEDURE — 80061 LIPID PANEL: CPT

## 2023-06-02 PROCEDURE — 85027 COMPLETE CBC AUTOMATED: CPT

## 2023-06-02 PROCEDURE — 36415 COLL VENOUS BLD VENIPUNCTURE: CPT

## 2023-06-05 NOTE — RESULT ENCOUNTER NOTE
Deagreg Ayala,     -Normal red blood cell (hgb) levels, minimally decreased white blood cell count and normal platelet levels.   -LDL(bad) cholesterol level is slightly elevated which can increase your heart disease risk.  A diet high in fat and simple carbohydrates, genetics and being overweight can contribute to this. ADVISE: exercising 150 minutes of aerobic exercise per week (30 minutes for 5 days per week or 50 minutes for 3 days per week are options) and eating a low saturated fat/low carbohydrate diet are helpful to improve this. In 12 months, you should recheck your fasting cholesterol panel .  -Liver and gallbladder tests are normal (ALT,AST, Alk phos, bilirubin), kidney function is normal (Cr, GFR), sodium is normal, potassium is normal, calcium is normal, glucose is normal.  -Ferritin (iron) level is normal.      Please send a Nimbit message or call [phone]  if you have any questions.      Margaret Parr, ABHAY, CNP  [facility] [place]    If you have further questions about the interpretation of your labs, [email or web address].org is a good website to check out for further information.     Excellent postoperative appearance.

## 2023-06-07 ENCOUNTER — OFFICE VISIT (OUTPATIENT)
Dept: FAMILY MEDICINE | Facility: CLINIC | Age: 63
End: 2023-06-07
Payer: COMMERCIAL

## 2023-06-07 VITALS
OXYGEN SATURATION: 99 % | HEIGHT: 69 IN | BODY MASS INDEX: 21.62 KG/M2 | WEIGHT: 146 LBS | TEMPERATURE: 97.1 F | RESPIRATION RATE: 18 BRPM | HEART RATE: 98 BPM | SYSTOLIC BLOOD PRESSURE: 112 MMHG | DIASTOLIC BLOOD PRESSURE: 82 MMHG

## 2023-06-07 DIAGNOSIS — K62.3 RECTAL PROLAPSE: ICD-10-CM

## 2023-06-07 DIAGNOSIS — Z01.818 PREOP GENERAL PHYSICAL EXAM: Primary | ICD-10-CM

## 2023-06-07 DIAGNOSIS — Z12.31 VISIT FOR SCREENING MAMMOGRAM: ICD-10-CM

## 2023-06-07 PROCEDURE — 99204 OFFICE O/P NEW MOD 45 MIN: CPT | Performed by: NURSE PRACTITIONER

## 2023-06-07 NOTE — H&P (VIEW-ONLY)
[address] [address]  Phone: [phone]  Primary Provider: Judith Parr  Pre-op Performing Provider: JUDITH PARR      PREOPERATIVE EVALUATION:  Today's date: 6/7/2023    Alma Mata is a 63 year old female who presents for a preoperative evaluation.      6/7/2023    10:08 AM   Additional Questions   Roomed by Clare GONZALEZ MA   Accompanied by self     Surgical Information:  Surgery/Procedure: robotic assisted supracervical hysterectomy,Davinci Salpingo-Oophorectomy Including Bilateral,robotic assisted sacrocolpopexy, cystoscopy,robotic ventral rectopexy with mesh, possible suture rectopexy, POSSIBLE RECTOCELE REPAIR  Surgery Location: [facility]  Surgeon: Dr. Downs, Dr. Oneal, Dr. Caldwell   Surgery Date: 6/28/23  Time of Surgery: 7:30 AM  Where patient plans to recover: At home with family  Fax number for surgical facility: Note does not need to be faxed, will be available electronically in Epic.    Assessment & Plan     The proposed surgical procedure is considered INTERMEDIATE risk.    Alma was seen today for pre-op exam.    Diagnoses and all orders for this visit:    Preop general physical exam  Rectal prolapse    Visit for screening mammogram  -     MA SCREENING DIGITAL BILAT - Future  (s+30); Future       - No identified additional risk factors other than previously addressed    Antiplatelet or Anticoagulation Medication Instructions:   - Patient is on no antiplatelet or anticoagulation medications.    Additional Medication Instructions:   - Herbal medications and vitamins: HOLD 14 days prior to surgery.    RECOMMENDATION:  APPROVAL GIVEN to proceed with proposed procedure, without further diagnostic evaluation.          Subjective       HPI related to upcoming procedure:     Following with colorectal surgery and Dr. Caldwell is planning supracervical hysterectomy.    History of mesh surgery for rectal  prolapse in March 2020, now with issues with mesh collapsing and rectal prolapse.    Urologist - Dr. Kimmy Downs - [facility]  Participated in pelvic floor physical therapy.          5/31/2023    10:06 AM   Preop Questions   1. Have you ever had a heart attack or stroke? No   2. Have you ever had surgery on your heart or blood vessels, such as a stent placement, a coronary artery bypass, or surgery on an artery in your head, neck, heart, or legs? No   3. Do you have chest pain with activity? No   4. Do you have a history of  heart failure? No   5. Do you currently have a cold, bronchitis or symptoms of other infection? No   6. Do you have a cough, shortness of breath, or wheezing? No   7. Do you or anyone in your family have previous history of blood clots? No   8. Do you or does anyone in your family have a serious bleeding problem such as prolonged bleeding following surgeries or cuts? No   9. Have you ever had problems with anemia or been told to take iron pills? No   10. Have you had any abnormal blood loss such as black, tarry or bloody stools, or abnormal vaginal bleeding? No   11. Have you ever had a blood transfusion? No   12. Are you willing to have a blood transfusion if it is medically needed before, during, or after your surgery? Yes   13. Have you or any of your relatives ever had problems with anesthesia? YES - nausea, dry heaving   14. Do you have sleep apnea, excessive snoring or daytime drowsiness? No   15. Do you have any artifical heart valves or other implanted medical devices like a pacemaker, defibrillator, or continuous glucose monitor? No   16. Do you have artificial joints? No   17. Are you allergic to latex? No       Health Care Directive:  Patient has a Health Care Directive on file      Preoperative Review of :   reviewed - no record of controlled substances prescribed.      Status of Chronic Conditions:  See problem list for active medical problems.  Problems all  longstanding and stable, except as noted/documented.  See ROS for pertinent symptoms related to these conditions.      Review of Systems     CONSTITUTIONAL: NEGATIVE for fever, chills, change in weight  INTEGUMENTARY/SKIN: NEGATIVE for worrisome rashes, moles or lesions  EYES: NEGATIVE for vision changes or irritation  ENT/MOUTH: NEGATIVE for ear, mouth and throat problems  RESP: NEGATIVE for significant cough or SOB  CV: NEGATIVE for chest pain, palpitations or peripheral edema  GI: NEGATIVE for nausea, abdominal pain, heartburn, or change in bowel habits  : NEGATIVE for frequency, dysuria, or hematuria  MUSCULOSKELETAL: NEGATIVE for significant arthralgias or myalgia  NEURO: NEGATIVE for weakness, dizziness or paresthesias  ENDOCRINE: NEGATIVE for temperature intolerance, skin/hair changes  HEME: NEGATIVE for bleeding problems  PSYCHIATRIC: NEGATIVE for changes in mood or affect    Patient Active Problem List    Diagnosis Date Noted     Rectal prolapse 05/08/2020     Priority: Medium     Glaucoma (increased eye pressure) 01/01/2018     Priority: Medium     Partial rectal prolapse 01/01/2015     Priority: Medium     Allergic rhinitis 08/14/2007     Priority: Medium     Problem list name updated by automated process. Provider to review        Past Medical History:   Diagnosis Date     Arthritis     slight arthritis in right hand     CARDIOVASCULAR SCREENING; LDL GOAL LESS THAN 160 2/10/2010     Motion sickness      PONV (postoperative nausea and vomiting)      Subclinical hyperthyroidism      Past Surgical History:   Procedure Laterality Date     COLONOSCOPY  1/6/2009     DAVINCI RECTOPEXY N/A 5/8/2020    Procedure: ROBOTIC ASSISTED RECTOPEXY WITH MESH AND PROCTOSCOPY;  Surgeon: Jo Ann Kothari MD;  Location: [facility] OR     GI SURGERY  1/23/2009    Surgery for prolapsed rectum     SURGICAL HISTORY OF -   11/2003    Left thumb surgery after laceration     Current Outpatient Medications   Medication Sig Dispense  "Refill     calcium-vitamin D 500-125 MG-UNIT TABS Take 1 tablet by mouth daily        diphenhydrAMINE (BENADRYL) 25 MG tablet Take 25 mg by mouth nightly as needed for itching or allergies (allergies)       fluticasone (FLONASE) 50 MCG/ACT nasal spray Spray 1 spray into both nostrils daily       latanoprost (XALATAN) 0.005 % ophthalmic solution Place 1 drop into both eyes every evening        MULTI-VITAMIN OR TABS Take 1 tablet by mouth daily  30 0     timolol maleate (TIMOPTIC) 0.5 % ophthalmic solution Place 1 drop into both eyes every morning          No Known Allergies     Social History     Tobacco Use     Smoking status: Never     Smokeless tobacco: Never   Vaping Use     Vaping status: Not on file   Substance Use Topics     Alcohol use: No     Family History   Problem Relation Age of Onset     Heart Disease Mother      Coronary Artery Disease Mother      Anesthesia Reaction Mother      Heart Disease Father      Hypertension Father      Hyperlipidemia Father      Other Cancer Father         melanoma     Breast Cancer Sister 65     Diabetes Brother         type 2     Hypertension Brother      Hyperlipidemia Brother      Hypertension Sister      Breast Cancer Sister         Type 3A     Anesthesia Reaction Sister      Other Cancer Sister         melanoma     History   Drug Use No         Objective     /82   Pulse 98   Temp 97.1  F (36.2  C)   Resp 18   Ht 1.753 m (5' 9\")   Wt 66.2 kg (146 lb)   SpO2 99%   BMI 21.56 kg/m      Physical Exam        GENERAL APPEARANCE: healthy, alert and no distress     EYES: EOMI, PERRL     HENT: ear canals and TM's normal and nose and mouth without ulcers or lesions     NECK: no adenopathy, no asymmetry, masses     RESP: lungs clear to auscultation - no rales, rhonchi or wheezes     CV: regular rates and rhythm, normal S1 S2, no S3 or S4 and no murmur, click or rub     ABDOMEN:  soft, nontender, no HSM or masses and bowel sounds normal     MS: extremities normal- no " gross deformities noted, no evidence of inflammation in joints     SKIN: no suspicious lesions or rashes     NEURO: Normal strength and tone, sensory exam grossly normal, mentation intact and speech normal     PSYCH: mentation appears normal. and affect normal/bright     LYMPHATICS: No cervical adenopathy    Recent Labs   Lab Test 06/02/23  0814   HGB 14.6         POTASSIUM 4.8   CR 0.79        Diagnostics:  No labs were ordered during this visit.   No EKG required, no history of coronary heart disease, significant arrhythmia, peripheral arterial disease or other structural heart disease.    Revised Cardiac Risk Index (RCRI):  The patient has the following serious cardiovascular risks for perioperative complications:   - No serious cardiac risks = 0 points     RCRI Interpretation: 0 points: Class I (very low risk - 0.4% complication rate)           Signed Electronically by: ABHAY Koroma CNP  Copy of this evaluation report is provided to requesting physician.

## 2023-06-07 NOTE — PROGRESS NOTES
[address] [address]  Phone: [phone]  Primary Provider: Judith Parr  Pre-op Performing Provider: JUDITH PARR      PREOPERATIVE EVALUATION:  Today's date: 6/7/2023    Alma Mata is a 63 year old female who presents for a preoperative evaluation.      6/7/2023    10:08 AM   Additional Questions   Roomed by Clare GONZALEZ MA   Accompanied by self     Surgical Information:  Surgery/Procedure: robotic assisted supracervical hysterectomy,Davinci Salpingo-Oophorectomy Including Bilateral,robotic assisted sacrocolpopexy, cystoscopy,robotic ventral rectopexy with mesh, possible suture rectopexy, POSSIBLE RECTOCELE REPAIR  Surgery Location: [facility]  Surgeon: Dr. Downs, Dr. Oneal, Dr. Caldwell   Surgery Date: 6/28/23  Time of Surgery: 7:30 AM  Where patient plans to recover: At home with family  Fax number for surgical facility: Note does not need to be faxed, will be available electronically in Epic.    Assessment & Plan     The proposed surgical procedure is considered INTERMEDIATE risk.    Alma was seen today for pre-op exam.    Diagnoses and all orders for this visit:    Preop general physical exam  Rectal prolapse    Visit for screening mammogram  -     MA SCREENING DIGITAL BILAT - Future  (s+30); Future       - No identified additional risk factors other than previously addressed    Antiplatelet or Anticoagulation Medication Instructions:   - Patient is on no antiplatelet or anticoagulation medications.    Additional Medication Instructions:   - Herbal medications and vitamins: HOLD 14 days prior to surgery.    RECOMMENDATION:  APPROVAL GIVEN to proceed with proposed procedure, without further diagnostic evaluation.          Subjective       HPI related to upcoming procedure:     Following with colorectal surgery and Dr. Caldwell is planning supracervical hysterectomy.    History of mesh surgery for rectal  prolapse in March 2020, now with issues with mesh collapsing and rectal prolapse.    Urologist - Dr. Kimmy Downs - [facility]  Participated in pelvic floor physical therapy.          5/31/2023    10:06 AM   Preop Questions   1. Have you ever had a heart attack or stroke? No   2. Have you ever had surgery on your heart or blood vessels, such as a stent placement, a coronary artery bypass, or surgery on an artery in your head, neck, heart, or legs? No   3. Do you have chest pain with activity? No   4. Do you have a history of  heart failure? No   5. Do you currently have a cold, bronchitis or symptoms of other infection? No   6. Do you have a cough, shortness of breath, or wheezing? No   7. Do you or anyone in your family have previous history of blood clots? No   8. Do you or does anyone in your family have a serious bleeding problem such as prolonged bleeding following surgeries or cuts? No   9. Have you ever had problems with anemia or been told to take iron pills? No   10. Have you had any abnormal blood loss such as black, tarry or bloody stools, or abnormal vaginal bleeding? No   11. Have you ever had a blood transfusion? No   12. Are you willing to have a blood transfusion if it is medically needed before, during, or after your surgery? Yes   13. Have you or any of your relatives ever had problems with anesthesia? YES - nausea, dry heaving   14. Do you have sleep apnea, excessive snoring or daytime drowsiness? No   15. Do you have any artifical heart valves or other implanted medical devices like a pacemaker, defibrillator, or continuous glucose monitor? No   16. Do you have artificial joints? No   17. Are you allergic to latex? No       Health Care Directive:  Patient has a Health Care Directive on file      Preoperative Review of :   reviewed - no record of controlled substances prescribed.      Status of Chronic Conditions:  See problem list for active medical problems.  Problems all  longstanding and stable, except as noted/documented.  See ROS for pertinent symptoms related to these conditions.      Review of Systems     CONSTITUTIONAL: NEGATIVE for fever, chills, change in weight  INTEGUMENTARY/SKIN: NEGATIVE for worrisome rashes, moles or lesions  EYES: NEGATIVE for vision changes or irritation  ENT/MOUTH: NEGATIVE for ear, mouth and throat problems  RESP: NEGATIVE for significant cough or SOB  CV: NEGATIVE for chest pain, palpitations or peripheral edema  GI: NEGATIVE for nausea, abdominal pain, heartburn, or change in bowel habits  : NEGATIVE for frequency, dysuria, or hematuria  MUSCULOSKELETAL: NEGATIVE for significant arthralgias or myalgia  NEURO: NEGATIVE for weakness, dizziness or paresthesias  ENDOCRINE: NEGATIVE for temperature intolerance, skin/hair changes  HEME: NEGATIVE for bleeding problems  PSYCHIATRIC: NEGATIVE for changes in mood or affect    Patient Active Problem List    Diagnosis Date Noted     Rectal prolapse 05/08/2020     Priority: Medium     Glaucoma (increased eye pressure) 01/01/2018     Priority: Medium     Partial rectal prolapse 01/01/2015     Priority: Medium     Allergic rhinitis 08/14/2007     Priority: Medium     Problem list name updated by automated process. Provider to review        Past Medical History:   Diagnosis Date     Arthritis     slight arthritis in right hand     CARDIOVASCULAR SCREENING; LDL GOAL LESS THAN 160 2/10/2010     Motion sickness      PONV (postoperative nausea and vomiting)      Subclinical hyperthyroidism      Past Surgical History:   Procedure Laterality Date     COLONOSCOPY  1/6/2009     DAVINCI RECTOPEXY N/A 5/8/2020    Procedure: ROBOTIC ASSISTED RECTOPEXY WITH MESH AND PROCTOSCOPY;  Surgeon: Jo Ann Kothari MD;  Location: [facility] OR     GI SURGERY  1/23/2009    Surgery for prolapsed rectum     SURGICAL HISTORY OF -   11/2003    Left thumb surgery after laceration     Current Outpatient Medications   Medication Sig Dispense  "Refill     calcium-vitamin D 500-125 MG-UNIT TABS Take 1 tablet by mouth daily        diphenhydrAMINE (BENADRYL) 25 MG tablet Take 25 mg by mouth nightly as needed for itching or allergies (allergies)       fluticasone (FLONASE) 50 MCG/ACT nasal spray Spray 1 spray into both nostrils daily       latanoprost (XALATAN) 0.005 % ophthalmic solution Place 1 drop into both eyes every evening        MULTI-VITAMIN OR TABS Take 1 tablet by mouth daily  30 0     timolol maleate (TIMOPTIC) 0.5 % ophthalmic solution Place 1 drop into both eyes every morning          No Known Allergies     Social History     Tobacco Use     Smoking status: Never     Smokeless tobacco: Never   Vaping Use     Vaping status: Not on file   Substance Use Topics     Alcohol use: No     Family History   Problem Relation Age of Onset     Heart Disease Mother      Coronary Artery Disease Mother      Anesthesia Reaction Mother      Heart Disease Father      Hypertension Father      Hyperlipidemia Father      Other Cancer Father         melanoma     Breast Cancer Sister 65     Diabetes Brother         type 2     Hypertension Brother      Hyperlipidemia Brother      Hypertension Sister      Breast Cancer Sister         Type 3A     Anesthesia Reaction Sister      Other Cancer Sister         melanoma     History   Drug Use No         Objective     /82   Pulse 98   Temp 97.1  F (36.2  C)   Resp 18   Ht 1.753 m (5' 9\")   Wt 66.2 kg (146 lb)   SpO2 99%   BMI 21.56 kg/m      Physical Exam        GENERAL APPEARANCE: healthy, alert and no distress     EYES: EOMI, PERRL     HENT: ear canals and TM's normal and nose and mouth without ulcers or lesions     NECK: no adenopathy, no asymmetry, masses     RESP: lungs clear to auscultation - no rales, rhonchi or wheezes     CV: regular rates and rhythm, normal S1 S2, no S3 or S4 and no murmur, click or rub     ABDOMEN:  soft, nontender, no HSM or masses and bowel sounds normal     MS: extremities normal- no " gross deformities noted, no evidence of inflammation in joints     SKIN: no suspicious lesions or rashes     NEURO: Normal strength and tone, sensory exam grossly normal, mentation intact and speech normal     PSYCH: mentation appears normal. and affect normal/bright     LYMPHATICS: No cervical adenopathy    Recent Labs   Lab Test 06/02/23  0814   HGB 14.6         POTASSIUM 4.8   CR 0.79        Diagnostics:  No labs were ordered during this visit.   No EKG required, no history of coronary heart disease, significant arrhythmia, peripheral arterial disease or other structural heart disease.    Revised Cardiac Risk Index (RCRI):  The patient has the following serious cardiovascular risks for perioperative complications:   - No serious cardiac risks = 0 points     RCRI Interpretation: 0 points: Class I (very low risk - 0.4% complication rate)           Signed Electronically by: ABHAY Koroma CNP  Copy of this evaluation report is provided to requesting physician.

## 2023-06-07 NOTE — PATIENT INSTRUCTIONS
For informational purposes only. Not to replace the advice of your health care provider. Copyright   2003,  [facility]. All rights reserved. Clinically reviewed by Fernanda Matias MD. [employer] [id number] - REV .  Preparing for Your Surgery  Getting started  A nurse will call you to review your health history and instructions. They will give you an arrival time based on your scheduled surgery time. Please be ready to share:  Your doctor's clinic name and phone number  Your medical, surgical, and anesthesia history  A list of allergies and sensitivities  A list of medicines, including herbal treatments and over-the-counter drugs  Whether the patient has a legal guardian (ask how to send us the papers in advance)  Please tell us if you're pregnant--or if there's any chance you might be pregnant. Some surgeries may injure a fetus (unborn baby), so they require a pregnancy test. Surgeries that are safe for a fetus don't always need a test, and you can choose whether to have one.   If you have a child who's having surgery, please ask for a copy of Preparing for Your Child's Surgery.    Preparing for surgery  Within 10 to 30 days of surgery: Have a pre-op exam (sometimes called an H&P, or History and Physical). This can be done at a clinic or pre-operative center.  If you're having a , you may not need this exam. Talk to your care team.  At your pre-op exam, talk to your care team about all medicines you take. If you need to stop any medicines before surgery, ask when to start taking them again.  We do this for your safety. Many medicines can make you bleed too much during surgery. Some change how well surgery (anesthesia) drugs work.  Call your insurance company to let them know you're having surgery. (If you don't have insurance, call [phone].)  Call your clinic if there's any change in your health. This includes signs of a cold or flu (sore throat, runny nose, cough, rash, fever). It  also includes a scrape or scratch near the surgery site.  If you have questions on the day of surgery, call your hospital or surgery center.  Eating and drinking guidelines  For your safety: Unless your surgeon tells you otherwise, follow the guidelines below.  Eat and drink as usual until 8 hours before you arrive for surgery. After that, no food or milk.  Drink clear liquids until 2 hours before you arrive. These are liquids you can see through, like water, Gatorade, and Propel Water. They also include plain black coffee and tea (no cream or milk), candy, and breath mints. You can spit out gum when you arrive.  If you drink alcohol: Stop drinking it the night before surgery.  If your care team tells you to take medicine on the morning of surgery, it's okay to take it with a sip of water.  Preventing infection  Shower or bathe the night before and morning of your surgery. Follow the instructions your clinic gave you. (If no instructions, use regular soap.)  Don't shave or clip hair near your surgery site. We'll remove the hair if needed.  Don't smoke or vape the morning of surgery. You may chew nicotine gum up to 2 hours before surgery. A nicotine patch is okay.  Note: Some surgeries require you to completely quit smoking and nicotine. Check with your surgeon.  Your care team will make every effort to keep you safe from infection. We will:  Clean our hands often with soap and water (or an alcohol-based hand rub).  Clean the skin at your surgery site with a special soap that kills germs.  Give you a special gown to keep you warm. (Cold raises the risk of infection.)  Wear special hair covers, masks, gowns and gloves during surgery.  Give antibiotic medicine, if prescribed. Not all surgeries need antibiotics.  What to bring on the day of surgery  Photo ID and insurance card  Copy of your health care directive, if you have one  Glasses and hearing aids (bring cases)  You can't wear contacts during surgery  Inhaler and  eye drops, if you use them (tell us about these when you arrive)  CPAP machine or breathing device, if you use them  A few personal items, if spending the night  If you have . . .  A pacemaker, ICD (cardiac defibrillator) or other implant: Bring the ID card.  An implanted stimulator: Bring the remote control.  A legal guardian: Bring a copy of the certified (court-stamped) guardianship papers.  Please remove any jewelry, including body piercings. Leave jewelry and other valuables at home.  If you're going home the day of surgery  You must have a responsible adult drive you home. They should stay with you overnight as well.  If you don't have someone to stay with you, and you aren't safe to go home alone, we may keep you overnight. Insurance often won't pay for this.  After surgery  If it's hard to control your pain or you need more pain medicine, please call your surgeon's office.  Questions?   If you have any questions for your care team, list them here: _________________________________________________________________________________________________________________________________________________________________________ ____________________________________ ____________________________________ ____________________________________    How to Take Your Medication Before Surgery  - STOP taking all vitamins and herbal supplements 14 days before surgery.

## 2023-06-27 ENCOUNTER — HOSPITAL ENCOUNTER (OUTPATIENT)
Facility: CLINIC | Age: 63
Discharge: HOME | End: 2023-06-27
Attending: COLON & RECTAL SURGERY | Admitting: COLON & RECTAL SURGERY
Payer: COMMERCIAL

## 2023-06-27 VITALS
RESPIRATION RATE: 16 BRPM | DIASTOLIC BLOOD PRESSURE: 79 MMHG | OXYGEN SATURATION: 98 % | TEMPERATURE: 98.6 F | SYSTOLIC BLOOD PRESSURE: 109 MMHG | HEART RATE: 71 BPM

## 2023-06-27 LAB — COLONOSCOPY: NORMAL

## 2023-06-27 PROCEDURE — 45378 DIAGNOSTIC COLONOSCOPY: CPT | Performed by: COLON & RECTAL SURGERY

## 2023-06-27 PROCEDURE — 250N000011 HC RX IP 250 OP 636: Performed by: COLON & RECTAL SURGERY

## 2023-06-27 PROCEDURE — 99153 MOD SED SAME PHYS/QHP EA: CPT | Performed by: COLON & RECTAL SURGERY

## 2023-06-27 PROCEDURE — 250N000013 HC RX MED GY IP 250 OP 250 PS 637: Performed by: COLON & RECTAL SURGERY

## 2023-06-27 PROCEDURE — G0121 COLON CA SCRN NOT HI RSK IND: HCPCS | Performed by: COLON & RECTAL SURGERY

## 2023-06-27 PROCEDURE — G0500 MOD SEDAT ENDO SERVICE >5YRS: HCPCS | Performed by: COLON & RECTAL SURGERY

## 2023-06-27 RX ORDER — ONDANSETRON 4 MG/1
4 TABLET, ORALLY DISINTEGRATING ORAL EVERY 6 HOURS PRN
Status: DISCONTINUED | OUTPATIENT
Start: 2023-06-27 | End: 2023-06-27 | Stop reason: HOSPADM

## 2023-06-27 RX ORDER — ONDANSETRON 2 MG/ML
4 INJECTION INTRAMUSCULAR; INTRAVENOUS EVERY 6 HOURS PRN
Status: DISCONTINUED | OUTPATIENT
Start: 2023-06-27 | End: 2023-06-27 | Stop reason: HOSPADM

## 2023-06-27 RX ORDER — FLUMAZENIL 0.1 MG/ML
0.2 INJECTION, SOLUTION INTRAVENOUS
Status: DISCONTINUED | OUTPATIENT
Start: 2023-06-27 | End: 2023-06-27 | Stop reason: HOSPADM

## 2023-06-27 RX ORDER — ONDANSETRON 2 MG/ML
4 INJECTION INTRAMUSCULAR; INTRAVENOUS
Status: DISCONTINUED | OUTPATIENT
Start: 2023-06-27 | End: 2023-06-27 | Stop reason: HOSPADM

## 2023-06-27 RX ORDER — PROCHLORPERAZINE MALEATE 10 MG
10 TABLET ORAL EVERY 6 HOURS PRN
Status: DISCONTINUED | OUTPATIENT
Start: 2023-06-27 | End: 2023-06-27 | Stop reason: HOSPADM

## 2023-06-27 RX ORDER — NALOXONE HYDROCHLORIDE 0.4 MG/ML
0.2 INJECTION, SOLUTION INTRAMUSCULAR; INTRAVENOUS; SUBCUTANEOUS
Status: DISCONTINUED | OUTPATIENT
Start: 2023-06-27 | End: 2023-06-27 | Stop reason: HOSPADM

## 2023-06-27 RX ORDER — DIPHENHYDRAMINE HYDROCHLORIDE 50 MG/ML
25-50 INJECTION INTRAMUSCULAR; INTRAVENOUS
Status: DISCONTINUED | OUTPATIENT
Start: 2023-06-27 | End: 2023-06-27 | Stop reason: HOSPADM

## 2023-06-27 RX ORDER — EPINEPHRINE 1 MG/ML
0.1 INJECTION, SOLUTION INTRAMUSCULAR; SUBCUTANEOUS
Status: DISCONTINUED | OUTPATIENT
Start: 2023-06-27 | End: 2023-06-27 | Stop reason: HOSPADM

## 2023-06-27 RX ORDER — SIMETHICONE 40MG/0.6ML
133 SUSPENSION, DROPS(FINAL DOSAGE FORM)(ML) ORAL
Status: COMPLETED | OUTPATIENT
Start: 2023-06-27 | End: 2023-06-27

## 2023-06-27 RX ORDER — LIDOCAINE 40 MG/G
CREAM TOPICAL
Status: DISCONTINUED | OUTPATIENT
Start: 2023-06-27 | End: 2023-06-27 | Stop reason: HOSPADM

## 2023-06-27 RX ORDER — NALOXONE HYDROCHLORIDE 0.4 MG/ML
0.4 INJECTION, SOLUTION INTRAMUSCULAR; INTRAVENOUS; SUBCUTANEOUS
Status: DISCONTINUED | OUTPATIENT
Start: 2023-06-27 | End: 2023-06-27 | Stop reason: HOSPADM

## 2023-06-27 RX ORDER — ATROPINE SULFATE 0.1 MG/ML
1 INJECTION INTRAVENOUS
Status: DISCONTINUED | OUTPATIENT
Start: 2023-06-27 | End: 2023-06-27 | Stop reason: HOSPADM

## 2023-06-27 RX ORDER — FENTANYL CITRATE 50 UG/ML
50-100 INJECTION, SOLUTION INTRAMUSCULAR; INTRAVENOUS EVERY 5 MIN PRN
Status: DISCONTINUED | OUTPATIENT
Start: 2023-06-27 | End: 2023-06-27 | Stop reason: HOSPADM

## 2023-06-27 RX ADMIN — SIMETHICONE 133 MG: 20 EMULSION ORAL at 07:53

## 2023-06-27 RX ADMIN — FENTANYL CITRATE 100 MCG: 50 INJECTION, SOLUTION INTRAMUSCULAR; INTRAVENOUS at 07:41

## 2023-06-27 RX ADMIN — MIDAZOLAM 2 MG: 1 INJECTION INTRAMUSCULAR; INTRAVENOUS at 07:40

## 2023-06-27 ASSESSMENT — ACTIVITIES OF DAILY LIVING (ADL): ADLS_ACUITY_SCORE: 35

## 2023-06-27 NOTE — PROGRESS NOTES
PTA medications updated by Medication Scribe prior to surgery via phone call with patient (last doses completed by Nurse)     Medication history sources: Patient, H&P and Patient's home med list  In the past week, patient estimated taking medication this percent of the time: Greater than 90%      Significant changes made to the medication list:  None      Additional medication history information:   None    Medication reconciliation completed by provider prior to medication history? No    Time spent in this activity: 30 MINUTES    The information provided in this note is only as accurate as the sources available at the time of update(s)      Prior to Admission medications    Medication Sig Last Dose Taking? Auth Provider Long Term End Date   calcium-vitamin D 500-125 MG-UNIT TABS Take 1 tablet by mouth daily   Yes Reported, Patient     diphenhydrAMINE (BENADRYL) 25 MG tablet Take 25 mg by mouth nightly as needed for itching (saesonal allergies)  at PRN Yes Reported, Patient     fluticasone (FLONASE) 50 MCG/ACT nasal spray Spray 1 spray into both nostrils daily as needed (seasonal allergies)  at PRN Yes Reported, Patient     latanoprost (XALATAN) 0.005 % ophthalmic solution Place 1 drop into both eyes every evening   at PM Yes Reported, Patient Yes    MULTI-VITAMIN OR TABS Take 1 tablet by mouth daily   Yes Dayami Cruz MD     timolol maleate (TIMOPTIC) 0.5 % ophthalmic solution Place 1 drop into both eyes every morning   at AM Yes Reported, Patient

## 2023-06-27 NOTE — H&P
Pre-Endoscopy History and Physical     Alma Mata MRN# [id number]   YOB: 1960 Age: 63 year old     Date of Procedure: 6/27/2023  Primary care provider: Margaret Parr  Type of Endoscopy: Colonoscopy  Reason for Procedure: Screening  Type of Anesthesia Anticipated: Moderate Sedation    HPI:    Alma is a 63 year old female who will be undergoing the above procedure.      A history and physical has been performed. The patient's medications and allergies have been reviewed. The risks and benefits of the procedure and the sedation options and risks were discussed with the patient.  All questions were answered and informed consent was obtained.      She denies a personal or family history of anesthesia complications or bleeding disorders.     No Known Allergies     No current facility-administered medications on file prior to encounter.  calcium-vitamin D 500-125 MG-UNIT TABS, Take 1 tablet by mouth daily   diphenhydrAMINE (BENADRYL) 25 MG tablet, Take 25 mg by mouth nightly as needed for itching or allergies (allergies)  fluticasone (FLONASE) 50 MCG/ACT nasal spray, Spray 1 spray into both nostrils daily  latanoprost (XALATAN) 0.005 % ophthalmic solution, Place 1 drop into both eyes every evening   MULTI-VITAMIN OR TABS, Take 1 tablet by mouth daily   timolol maleate (TIMOPTIC) 0.5 % ophthalmic solution, Place 1 drop into both eyes every morning         Patient Active Problem List   Diagnosis     Allergic rhinitis     Partial rectal prolapse     Glaucoma (increased eye pressure)     Rectal prolapse        Past Medical History:   Diagnosis Date     Arthritis     slight arthritis in right hand     CARDIOVASCULAR SCREENING; LDL GOAL LESS THAN 160 2/10/2010     Motion sickness      PONV (postoperative nausea and vomiting)      Subclinical hyperthyroidism         Past Surgical History:   Procedure Laterality Date     COLONOSCOPY  1/6/2009     DAVINCI RECTOPEXY N/A 5/8/2020    Procedure: ROBOTIC  "ASSISTED RECTOPEXY WITH MESH AND PROCTOSCOPY;  Surgeon: Jo Ann Kothari MD;  Location: [facility] OR     GI SURGERY  1/23/2009    Surgery for prolapsed rectum     SURGICAL HISTORY OF -   11/2003    Left thumb surgery after laceration       Social History     Tobacco Use     Smoking status: Never     Smokeless tobacco: Never   Substance Use Topics     Alcohol use: No       Family History   Problem Relation Age of Onset     Heart Disease Mother      Coronary Artery Disease Mother      Anesthesia Reaction Mother      Heart Disease Father      Hypertension Father      Hyperlipidemia Father      Other Cancer Father         melanoma     Diabetes Brother         type 2     Hypertension Brother      Hyperlipidemia Brother      Breast Cancer Sister 65     Hypertension Sister      Breast Cancer Sister         Type 3A     Anesthesia Reaction Sister      Other Cancer Sister         melanoma     Colon Cancer No family hx of        REVIEW OF SYSTEMS:     5 point ROS negative except as noted above in HPI, including Gen., Resp., CV, GI &  system review.      PHYSICAL EXAM:   There were no vitals taken for this visit. Estimated body mass index is 21.56 kg/m  as calculated from the following:    Height as of 6/7/23: 1.753 m (5' 9\").    Weight as of 6/7/23: 66.2 kg (146 lb).   GENERAL APPEARANCE: healthy and alert  MENTAL STATUS: alert  AIRWAY EXAM: Mallampatti Class I (visualization of the soft palate, fauces, uvula, anterior and posterior pillars)  RESP: lungs clear to auscultation - no rales, rhonchi or wheezes  CV: regular rates and rhythm      IMPRESSION   ASA Class 2 - Mild systemic disease        PLAN:     Plan for colonoscopy. We discussed the risks, benefits and alternatives and the patient wished to proceed.    The above has been forwarded to the consulting provider.      Selene Caldwell MD  [facility]  Phone: [phone]  Fax: [phone]  June 27, 2023    "

## 2023-06-28 ENCOUNTER — HOSPITAL ENCOUNTER (INPATIENT)
Facility: CLINIC | Age: 63
LOS: 1 days | Discharge: HOME | DRG: 330 | End: 2023-06-29
Attending: OBSTETRICS & GYNECOLOGY | Admitting: COLON & RECTAL SURGERY
Payer: COMMERCIAL

## 2023-06-28 ENCOUNTER — ANESTHESIA (OUTPATIENT)
Dept: SURGERY | Facility: CLINIC | Age: 63
DRG: 330 | End: 2023-06-28
Payer: COMMERCIAL

## 2023-06-28 ENCOUNTER — ANESTHESIA EVENT (OUTPATIENT)
Dept: SURGERY | Facility: CLINIC | Age: 63
DRG: 330 | End: 2023-06-28
Payer: COMMERCIAL

## 2023-06-28 DIAGNOSIS — K62.3 RECTAL PROLAPSE: Primary | ICD-10-CM

## 2023-06-28 LAB
ABO/RH(D): NORMAL
ANTIBODY SCREEN: NEGATIVE
SPECIMEN EXPIRATION DATE: NORMAL

## 2023-06-28 PROCEDURE — 258N000003 HC RX IP 258 OP 636: Performed by: ANESTHESIOLOGY

## 2023-06-28 PROCEDURE — C1781 MESH (IMPLANTABLE): HCPCS | Performed by: OBSTETRICS & GYNECOLOGY

## 2023-06-28 PROCEDURE — 88305 TISSUE EXAM BY PATHOLOGIST: CPT | Mod: 26 | Performed by: PATHOLOGY

## 2023-06-28 PROCEDURE — 250N000009 HC RX 250: Performed by: ANESTHESIOLOGY

## 2023-06-28 PROCEDURE — 8E0W4CZ ROBOTIC ASSISTED PROCEDURE OF TRUNK REGION, PERCUTANEOUS ENDOSCOPIC APPROACH: ICD-10-PCS | Performed by: OBSTETRICS & GYNECOLOGY

## 2023-06-28 PROCEDURE — 250N000013 HC RX MED GY IP 250 OP 250 PS 637: Performed by: COLON & RECTAL SURGERY

## 2023-06-28 PROCEDURE — 120N000001 HC R&B MED SURG/OB

## 2023-06-28 PROCEDURE — 0UT94ZL RESECTION OF UTERUS, SUPRACERVICAL, PERCUTANEOUS ENDOSCOPIC APPROACH: ICD-10-PCS | Performed by: OBSTETRICS & GYNECOLOGY

## 2023-06-28 PROCEDURE — 86901 BLOOD TYPING SEROLOGIC RH(D): CPT

## 2023-06-28 PROCEDURE — 250N000011 HC RX IP 250 OP 636: Mod: JZ | Performed by: COLON & RECTAL SURGERY

## 2023-06-28 PROCEDURE — 36415 COLL VENOUS BLD VENIPUNCTURE: CPT

## 2023-06-28 PROCEDURE — 250N000013 HC RX MED GY IP 250 OP 250 PS 637

## 2023-06-28 PROCEDURE — 0DSP0ZZ REPOSITION RECTUM, OPEN APPROACH: ICD-10-PCS | Performed by: COLON & RECTAL SURGERY

## 2023-06-28 PROCEDURE — 0UUG0JZ SUPPLEMENT VAGINA WITH SYNTHETIC SUBSTITUTE, OPEN APPROACH: ICD-10-PCS | Performed by: UROLOGY

## 2023-06-28 PROCEDURE — 250N000009 HC RX 250: Performed by: NURSE ANESTHETIST, CERTIFIED REGISTERED

## 2023-06-28 PROCEDURE — 86850 RBC ANTIBODY SCREEN: CPT

## 2023-06-28 PROCEDURE — C1769 GUIDE WIRE: HCPCS | Performed by: OBSTETRICS & GYNECOLOGY

## 2023-06-28 PROCEDURE — 0DUP0JZ SUPPLEMENT RECTUM WITH SYNTHETIC SUBSTITUTE, OPEN APPROACH: ICD-10-PCS | Performed by: COLON & RECTAL SURGERY

## 2023-06-28 PROCEDURE — 250N000025 HC SEVOFLURANE, PER MIN: Performed by: OBSTETRICS & GYNECOLOGY

## 2023-06-28 PROCEDURE — 250N000009 HC RX 250: Performed by: OBSTETRICS & GYNECOLOGY

## 2023-06-28 PROCEDURE — 258N000003 HC RX IP 258 OP 636: Performed by: NURSE ANESTHETIST, CERTIFIED REGISTERED

## 2023-06-28 PROCEDURE — 88305 TISSUE EXAM BY PATHOLOGIST: CPT | Mod: TC | Performed by: OBSTETRICS & GYNECOLOGY

## 2023-06-28 PROCEDURE — 258N000001 HC RX 258: Performed by: OBSTETRICS & GYNECOLOGY

## 2023-06-28 PROCEDURE — 710N000009 HC RECOVERY PHASE 1, LEVEL 1, PER MIN: Performed by: OBSTETRICS & GYNECOLOGY

## 2023-06-28 PROCEDURE — 0UT74ZZ RESECTION OF BILATERAL FALLOPIAN TUBES, PERCUTANEOUS ENDOSCOPIC APPROACH: ICD-10-PCS | Performed by: OBSTETRICS & GYNECOLOGY

## 2023-06-28 PROCEDURE — 360N000080 HC SURGERY LEVEL 7, PER MIN: Performed by: OBSTETRICS & GYNECOLOGY

## 2023-06-28 PROCEDURE — 250N000011 HC RX IP 250 OP 636: Performed by: NURSE ANESTHETIST, CERTIFIED REGISTERED

## 2023-06-28 PROCEDURE — 370N000017 HC ANESTHESIA TECHNICAL FEE, PER MIN: Performed by: OBSTETRICS & GYNECOLOGY

## 2023-06-28 PROCEDURE — 272N000001 HC OR GENERAL SUPPLY STERILE: Performed by: OBSTETRICS & GYNECOLOGY

## 2023-06-28 PROCEDURE — 999N000141 HC STATISTIC PRE-PROCEDURE NURSING ASSESSMENT: Performed by: OBSTETRICS & GYNECOLOGY

## 2023-06-28 PROCEDURE — 0UT24ZZ RESECTION OF BILATERAL OVARIES, PERCUTANEOUS ENDOSCOPIC APPROACH: ICD-10-PCS | Performed by: OBSTETRICS & GYNECOLOGY

## 2023-06-28 PROCEDURE — 0USG0ZZ REPOSITION VAGINA, OPEN APPROACH: ICD-10-PCS | Performed by: UROLOGY

## 2023-06-28 PROCEDURE — 250N000011 HC RX IP 250 OP 636: Performed by: COLON & RECTAL SURGERY

## 2023-06-28 DEVICE — MESH SLING Y SHAPE RESTORELLE 24X4CM 501420: Type: IMPLANTABLE DEVICE | Site: PELVIS | Status: FUNCTIONAL

## 2023-06-28 DEVICE — MESH SLING FLAT RESTORELLE 24X8CM 501440: Type: IMPLANTABLE DEVICE | Site: PELVIS | Status: FUNCTIONAL

## 2023-06-28 RX ORDER — FENTANYL CITRATE 0.05 MG/ML
25 INJECTION, SOLUTION INTRAMUSCULAR; INTRAVENOUS EVERY 5 MIN PRN
Status: DISCONTINUED | OUTPATIENT
Start: 2023-06-28 | End: 2023-06-28 | Stop reason: HOSPADM

## 2023-06-28 RX ORDER — SCOLOPAMINE TRANSDERMAL SYSTEM 1 MG/1
1 PATCH, EXTENDED RELEASE TRANSDERMAL
Status: DISCONTINUED | OUTPATIENT
Start: 2023-06-28 | End: 2023-06-29 | Stop reason: HOSPADM

## 2023-06-28 RX ORDER — LIDOCAINE HYDROCHLORIDE 20 MG/ML
INJECTION, SOLUTION INFILTRATION; PERINEURAL PRN
Status: DISCONTINUED | OUTPATIENT
Start: 2023-06-28 | End: 2023-06-28

## 2023-06-28 RX ORDER — METRONIDAZOLE 500 MG/100ML
500 INJECTION, SOLUTION INTRAVENOUS EVERY 12 HOURS
Status: COMPLETED | OUTPATIENT
Start: 2023-06-28 | End: 2023-06-29

## 2023-06-28 RX ORDER — ACETAMINOPHEN 325 MG/1
650 TABLET ORAL EVERY 4 HOURS PRN
Status: DISCONTINUED | OUTPATIENT
Start: 2023-07-01 | End: 2023-06-29 | Stop reason: HOSPADM

## 2023-06-28 RX ORDER — HYDROMORPHONE HCL IN WATER/PF 6 MG/30 ML
0.4 PATIENT CONTROLLED ANALGESIA SYRINGE INTRAVENOUS
Status: DISCONTINUED | OUTPATIENT
Start: 2023-06-28 | End: 2023-06-29 | Stop reason: HOSPADM

## 2023-06-28 RX ORDER — ACETAMINOPHEN 325 MG/1
975 TABLET ORAL ONCE
Status: COMPLETED | OUTPATIENT
Start: 2023-06-28 | End: 2023-06-28

## 2023-06-28 RX ORDER — NALOXONE HYDROCHLORIDE 0.4 MG/ML
0.2 INJECTION, SOLUTION INTRAMUSCULAR; INTRAVENOUS; SUBCUTANEOUS
Status: DISCONTINUED | OUTPATIENT
Start: 2023-06-28 | End: 2023-06-29 | Stop reason: HOSPADM

## 2023-06-28 RX ORDER — ONDANSETRON 2 MG/ML
4 INJECTION INTRAMUSCULAR; INTRAVENOUS EVERY 30 MIN PRN
Status: DISCONTINUED | OUTPATIENT
Start: 2023-06-28 | End: 2023-06-28 | Stop reason: HOSPADM

## 2023-06-28 RX ORDER — BUPIVACAINE HYDROCHLORIDE AND EPINEPHRINE 2.5; 5 MG/ML; UG/ML
INJECTION, SOLUTION INFILTRATION; PERINEURAL PRN
Status: DISCONTINUED | OUTPATIENT
Start: 2023-06-28 | End: 2023-06-28 | Stop reason: HOSPADM

## 2023-06-28 RX ORDER — FENTANYL CITRATE 0.05 MG/ML
50 INJECTION, SOLUTION INTRAMUSCULAR; INTRAVENOUS EVERY 5 MIN PRN
Status: DISCONTINUED | OUTPATIENT
Start: 2023-06-28 | End: 2023-06-28 | Stop reason: HOSPADM

## 2023-06-28 RX ORDER — TIMOLOL MALEATE 5 MG/ML
1 SOLUTION/ DROPS OPHTHALMIC EVERY MORNING
Status: DISCONTINUED | OUTPATIENT
Start: 2023-06-29 | End: 2023-06-29 | Stop reason: HOSPADM

## 2023-06-28 RX ORDER — LIDOCAINE 40 MG/G
CREAM TOPICAL
Status: DISCONTINUED | OUTPATIENT
Start: 2023-06-28 | End: 2023-06-28

## 2023-06-28 RX ORDER — ONDANSETRON 2 MG/ML
4 INJECTION INTRAMUSCULAR; INTRAVENOUS ONCE
Status: DISCONTINUED | OUTPATIENT
Start: 2023-06-28 | End: 2023-06-28

## 2023-06-28 RX ORDER — LIDOCAINE 40 MG/G
CREAM TOPICAL
Status: DISCONTINUED | OUTPATIENT
Start: 2023-06-28 | End: 2023-06-29 | Stop reason: HOSPADM

## 2023-06-28 RX ORDER — HYDROMORPHONE HCL IN WATER/PF 6 MG/30 ML
0.2 PATIENT CONTROLLED ANALGESIA SYRINGE INTRAVENOUS EVERY 5 MIN PRN
Status: DISCONTINUED | OUTPATIENT
Start: 2023-06-28 | End: 2023-06-28 | Stop reason: HOSPADM

## 2023-06-28 RX ORDER — ACETAMINOPHEN 325 MG/1
975 TABLET ORAL EVERY 8 HOURS
Status: DISCONTINUED | OUTPATIENT
Start: 2023-06-28 | End: 2023-06-29 | Stop reason: HOSPADM

## 2023-06-28 RX ORDER — SODIUM CHLORIDE, SODIUM LACTATE, POTASSIUM CHLORIDE, CALCIUM CHLORIDE 600; 310; 30; 20 MG/100ML; MG/100ML; MG/100ML; MG/100ML
INJECTION, SOLUTION INTRAVENOUS CONTINUOUS
Status: DISCONTINUED | OUTPATIENT
Start: 2023-06-28 | End: 2023-06-29 | Stop reason: HOSPADM

## 2023-06-28 RX ORDER — SODIUM CHLORIDE, SODIUM LACTATE, POTASSIUM CHLORIDE, CALCIUM CHLORIDE 600; 310; 30; 20 MG/100ML; MG/100ML; MG/100ML; MG/100ML
INJECTION, SOLUTION INTRAVENOUS CONTINUOUS
Status: DISCONTINUED | OUTPATIENT
Start: 2023-06-28 | End: 2023-06-28 | Stop reason: HOSPADM

## 2023-06-28 RX ORDER — OXYCODONE HYDROCHLORIDE 5 MG/1
10 TABLET ORAL EVERY 4 HOURS PRN
Status: DISCONTINUED | OUTPATIENT
Start: 2023-06-28 | End: 2023-06-29 | Stop reason: HOSPADM

## 2023-06-28 RX ORDER — ONDANSETRON 2 MG/ML
INJECTION INTRAMUSCULAR; INTRAVENOUS PRN
Status: DISCONTINUED | OUTPATIENT
Start: 2023-06-28 | End: 2023-06-28

## 2023-06-28 RX ORDER — DEXMEDETOMIDINE HYDROCHLORIDE 4 UG/ML
INJECTION, SOLUTION INTRAVENOUS PRN
Status: DISCONTINUED | OUTPATIENT
Start: 2023-06-28 | End: 2023-06-28

## 2023-06-28 RX ORDER — PROPOFOL 10 MG/ML
INJECTION, EMULSION INTRAVENOUS PRN
Status: DISCONTINUED | OUTPATIENT
Start: 2023-06-28 | End: 2023-06-28

## 2023-06-28 RX ORDER — CEFAZOLIN SODIUM/WATER 2 G/20 ML
2 SYRINGE (ML) INTRAVENOUS SEE ADMIN INSTRUCTIONS
Status: DISCONTINUED | OUTPATIENT
Start: 2023-06-28 | End: 2023-06-28

## 2023-06-28 RX ORDER — ONDANSETRON 4 MG/1
4 TABLET, ORALLY DISINTEGRATING ORAL EVERY 30 MIN PRN
Status: DISCONTINUED | OUTPATIENT
Start: 2023-06-28 | End: 2023-06-28 | Stop reason: HOSPADM

## 2023-06-28 RX ORDER — ESTRADIOL 0.1 MG/G
CREAM VAGINAL
Qty: 42 G | Refills: 0 | Status: SHIPPED | OUTPATIENT
Start: 2023-06-29 | End: 2023-06-29

## 2023-06-28 RX ORDER — ONDANSETRON 2 MG/ML
4 INJECTION INTRAMUSCULAR; INTRAVENOUS EVERY 6 HOURS PRN
Status: DISCONTINUED | OUTPATIENT
Start: 2023-06-28 | End: 2023-06-29 | Stop reason: HOSPADM

## 2023-06-28 RX ORDER — DEXAMETHASONE SODIUM PHOSPHATE 4 MG/ML
INJECTION, SOLUTION INTRA-ARTICULAR; INTRALESIONAL; INTRAMUSCULAR; INTRAVENOUS; SOFT TISSUE PRN
Status: DISCONTINUED | OUTPATIENT
Start: 2023-06-28 | End: 2023-06-28

## 2023-06-28 RX ORDER — LATANOPROST 50 UG/ML
1 SOLUTION/ DROPS OPHTHALMIC EVERY EVENING
Status: DISCONTINUED | OUTPATIENT
Start: 2023-06-28 | End: 2023-06-29 | Stop reason: HOSPADM

## 2023-06-28 RX ORDER — PHENAZOPYRIDINE HYDROCHLORIDE 200 MG/1
200 TABLET, FILM COATED ORAL ONCE
Status: COMPLETED | OUTPATIENT
Start: 2023-06-28 | End: 2023-06-28

## 2023-06-28 RX ORDER — FENTANYL CITRATE 50 UG/ML
INJECTION, SOLUTION INTRAMUSCULAR; INTRAVENOUS PRN
Status: DISCONTINUED | OUTPATIENT
Start: 2023-06-28 | End: 2023-06-28

## 2023-06-28 RX ORDER — NALOXONE HYDROCHLORIDE 0.4 MG/ML
0.4 INJECTION, SOLUTION INTRAMUSCULAR; INTRAVENOUS; SUBCUTANEOUS
Status: DISCONTINUED | OUTPATIENT
Start: 2023-06-28 | End: 2023-06-29 | Stop reason: HOSPADM

## 2023-06-28 RX ORDER — OXYCODONE HYDROCHLORIDE 5 MG/1
5 TABLET ORAL EVERY 4 HOURS PRN
Status: DISCONTINUED | OUTPATIENT
Start: 2023-06-28 | End: 2023-06-29 | Stop reason: HOSPADM

## 2023-06-28 RX ORDER — ONDANSETRON 4 MG/1
4 TABLET, ORALLY DISINTEGRATING ORAL EVERY 6 HOURS PRN
Status: DISCONTINUED | OUTPATIENT
Start: 2023-06-28 | End: 2023-06-29 | Stop reason: HOSPADM

## 2023-06-28 RX ORDER — METRONIDAZOLE 500 MG/100ML
500 INJECTION, SOLUTION INTRAVENOUS
Status: COMPLETED | OUTPATIENT
Start: 2023-06-28 | End: 2023-06-28

## 2023-06-28 RX ORDER — HYDROMORPHONE HCL IN WATER/PF 6 MG/30 ML
0.4 PATIENT CONTROLLED ANALGESIA SYRINGE INTRAVENOUS EVERY 5 MIN PRN
Status: DISCONTINUED | OUTPATIENT
Start: 2023-06-28 | End: 2023-06-28 | Stop reason: HOSPADM

## 2023-06-28 RX ORDER — SODIUM CHLORIDE, SODIUM LACTATE, POTASSIUM CHLORIDE, CALCIUM CHLORIDE 600; 310; 30; 20 MG/100ML; MG/100ML; MG/100ML; MG/100ML
INJECTION, SOLUTION INTRAVENOUS CONTINUOUS
Status: DISCONTINUED | OUTPATIENT
Start: 2023-06-28 | End: 2023-06-28

## 2023-06-28 RX ORDER — CEFOXITIN 2 G/1
2 INJECTION, POWDER, FOR SOLUTION INTRAVENOUS EVERY 8 HOURS
Status: DISCONTINUED | OUTPATIENT
Start: 2023-06-28 | End: 2023-06-28

## 2023-06-28 RX ORDER — CEPHALEXIN 500 MG/1
500 CAPSULE ORAL 2 TIMES DAILY
Qty: 2 CAPSULE | Refills: 0 | Status: SHIPPED | OUTPATIENT
Start: 2023-06-28 | End: 2023-06-29

## 2023-06-28 RX ORDER — PROPOFOL 10 MG/ML
INJECTION, EMULSION INTRAVENOUS CONTINUOUS PRN
Status: DISCONTINUED | OUTPATIENT
Start: 2023-06-28 | End: 2023-06-28

## 2023-06-28 RX ORDER — HYDROMORPHONE HCL IN WATER/PF 6 MG/30 ML
0.2 PATIENT CONTROLLED ANALGESIA SYRINGE INTRAVENOUS
Status: DISCONTINUED | OUTPATIENT
Start: 2023-06-28 | End: 2023-06-29 | Stop reason: HOSPADM

## 2023-06-28 RX ORDER — KETOROLAC TROMETHAMINE 30 MG/ML
INJECTION, SOLUTION INTRAMUSCULAR; INTRAVENOUS PRN
Status: DISCONTINUED | OUTPATIENT
Start: 2023-06-28 | End: 2023-06-28

## 2023-06-28 RX ORDER — CEFAZOLIN SODIUM 1 G/3ML
1 INJECTION, POWDER, FOR SOLUTION INTRAMUSCULAR; INTRAVENOUS EVERY 8 HOURS
Status: DISCONTINUED | OUTPATIENT
Start: 2023-06-28 | End: 2023-06-29 | Stop reason: HOSPADM

## 2023-06-28 RX ORDER — CEFAZOLIN SODIUM/WATER 2 G/20 ML
2 SYRINGE (ML) INTRAVENOUS
Status: DISCONTINUED | OUTPATIENT
Start: 2023-06-28 | End: 2023-06-28

## 2023-06-28 RX ORDER — MAGNESIUM HYDROXIDE 1200 MG/15ML
LIQUID ORAL PRN
Status: DISCONTINUED | OUTPATIENT
Start: 2023-06-28 | End: 2023-06-28 | Stop reason: HOSPADM

## 2023-06-28 RX ADMIN — ONDANSETRON 4 MG: 2 INJECTION INTRAMUSCULAR; INTRAVENOUS at 12:19

## 2023-06-28 RX ADMIN — Medication 2 G: at 11:31

## 2023-06-28 RX ADMIN — KETOROLAC TROMETHAMINE 15 MG: 30 INJECTION, SOLUTION INTRAMUSCULAR at 12:37

## 2023-06-28 RX ADMIN — FENTANYL CITRATE 50 MCG: 50 INJECTION, SOLUTION INTRAMUSCULAR; INTRAVENOUS at 07:42

## 2023-06-28 RX ADMIN — Medication 2 G: at 07:37

## 2023-06-28 RX ADMIN — ROCURONIUM BROMIDE 50 MG: 50 INJECTION, SOLUTION INTRAVENOUS at 07:42

## 2023-06-28 RX ADMIN — SODIUM CHLORIDE, POTASSIUM CHLORIDE, SODIUM LACTATE AND CALCIUM CHLORIDE: 600; 310; 30; 20 INJECTION, SOLUTION INTRAVENOUS at 06:35

## 2023-06-28 RX ADMIN — SUGAMMADEX 200 MG: 100 INJECTION, SOLUTION INTRAVENOUS at 12:35

## 2023-06-28 RX ADMIN — ROCURONIUM BROMIDE 20 MG: 50 INJECTION, SOLUTION INTRAVENOUS at 09:33

## 2023-06-28 RX ADMIN — FENTANYL CITRATE 50 MCG: 50 INJECTION, SOLUTION INTRAMUSCULAR; INTRAVENOUS at 08:19

## 2023-06-28 RX ADMIN — PHENYLEPHRINE HYDROCHLORIDE 0.3 MCG/KG/MIN: 10 INJECTION INTRAVENOUS at 11:08

## 2023-06-28 RX ADMIN — ACETAMINOPHEN 975 MG: 325 TABLET ORAL at 06:41

## 2023-06-28 RX ADMIN — DEXMEDETOMIDINE HYDROCHLORIDE 12 MCG: 200 INJECTION INTRAVENOUS at 12:27

## 2023-06-28 RX ADMIN — DEXAMETHASONE SODIUM PHOSPHATE 8 MG: 4 INJECTION, SOLUTION INTRA-ARTICULAR; INTRALESIONAL; INTRAMUSCULAR; INTRAVENOUS; SOFT TISSUE at 07:42

## 2023-06-28 RX ADMIN — METRONIDAZOLE 500 MG: 500 INJECTION, SOLUTION INTRAVENOUS at 17:27

## 2023-06-28 RX ADMIN — PHENAZOPYRIDINE 200 MG: 200 TABLET ORAL at 06:42

## 2023-06-28 RX ADMIN — LIDOCAINE HYDROCHLORIDE 100 MG: 20 INJECTION, SOLUTION INFILTRATION; PERINEURAL at 07:42

## 2023-06-28 RX ADMIN — SCOPALAMINE 1 PATCH: 1 PATCH, EXTENDED RELEASE TRANSDERMAL at 07:02

## 2023-06-28 RX ADMIN — HYDROMORPHONE HYDROCHLORIDE 0.5 MG: 1 INJECTION, SOLUTION INTRAMUSCULAR; INTRAVENOUS; SUBCUTANEOUS at 10:11

## 2023-06-28 RX ADMIN — ROCURONIUM BROMIDE 10 MG: 50 INJECTION, SOLUTION INTRAVENOUS at 11:13

## 2023-06-28 RX ADMIN — PHENYLEPHRINE HYDROCHLORIDE 0.5 MCG/KG/MIN: 10 INJECTION INTRAVENOUS at 07:49

## 2023-06-28 RX ADMIN — ONDANSETRON 4 MG: 4 TABLET, ORALLY DISINTEGRATING ORAL at 16:39

## 2023-06-28 RX ADMIN — ROCURONIUM BROMIDE 10 MG: 50 INJECTION, SOLUTION INTRAVENOUS at 11:42

## 2023-06-28 RX ADMIN — ROCURONIUM BROMIDE 10 MG: 50 INJECTION, SOLUTION INTRAVENOUS at 11:58

## 2023-06-28 RX ADMIN — SODIUM CHLORIDE, POTASSIUM CHLORIDE, SODIUM LACTATE AND CALCIUM CHLORIDE: 600; 310; 30; 20 INJECTION, SOLUTION INTRAVENOUS at 10:03

## 2023-06-28 RX ADMIN — PROPOFOL 200 MG: 10 INJECTION, EMULSION INTRAVENOUS at 07:42

## 2023-06-28 RX ADMIN — PROPOFOL 50 MCG/KG/MIN: 10 INJECTION, EMULSION INTRAVENOUS at 07:42

## 2023-06-28 RX ADMIN — ROCURONIUM BROMIDE 30 MG: 50 INJECTION, SOLUTION INTRAVENOUS at 08:37

## 2023-06-28 RX ADMIN — ROCURONIUM BROMIDE 20 MG: 50 INJECTION, SOLUTION INTRAVENOUS at 09:59

## 2023-06-28 RX ADMIN — CEFAZOLIN 1 G: 1 INJECTION, POWDER, FOR SOLUTION INTRAMUSCULAR; INTRAVENOUS at 19:18

## 2023-06-28 RX ADMIN — MIDAZOLAM 2 MG: 1 INJECTION INTRAMUSCULAR; INTRAVENOUS at 07:33

## 2023-06-28 RX ADMIN — SODIUM CHLORIDE, POTASSIUM CHLORIDE, SODIUM LACTATE AND CALCIUM CHLORIDE: 600; 310; 30; 20 INJECTION, SOLUTION INTRAVENOUS at 12:42

## 2023-06-28 RX ADMIN — METRONIDAZOLE 500 MG: 500 INJECTION, SOLUTION INTRAVENOUS at 06:36

## 2023-06-28 RX ADMIN — ROCURONIUM BROMIDE 20 MG: 50 INJECTION, SOLUTION INTRAVENOUS at 10:38

## 2023-06-28 ASSESSMENT — LIFESTYLE VARIABLES: TOBACCO_USE: 0

## 2023-06-28 ASSESSMENT — ACTIVITIES OF DAILY LIVING (ADL)
ADLS_ACUITY_SCORE: 20

## 2023-06-28 NOTE — OP NOTE
Procedure Date: 06/28/2023    PREOPERATIVE DIAGNOSIS:  Enterocele, grade III.  Uterine prolapse with posterior deviation of the uterus upon straining.    POSTOPERATIVE DIAGNOSIS:       PROCEDURE:  Robotically assisted sacral colpopexy and cystoscopy.    SURGEON:  Domonique Oneal MD    FIRST ASSISTANT:  Doe Foster in conjunction with robotically-assisted supracervical hysterectomy, bilateral salpingo-oophorectomy by Dr. Downs, first assist Domonique Oneal, and robotically-assisted anterior rectopexy with the mesh and sutured rectopexy by Dr. Caldwell.    ESTIMATED BLOOD LOSS:  20 mL    SPECIMENS:  Uterus with no cervix, bilateral fallopian tubes and ovaries.    INDICATIONS FOR PROCEDURE:  The patient is a 63-year-old female with full thickness, very large rectal prolapse, recurrent.  This is her fourth surgery to correct her prolapse.  A defecography did demonstrate enterocele consistent with the cervical deviation posteriorly preventing adequate evacuation of the stool. In addition, she was found to have enterocele and uterine prolapse on the exam with quite narrow vaginal introitus.  She was consented for above procedure with the risks of bleeding, infection, need for additional surgery, de amee urge or stress incontinence.  Of note, I talked to her about her preexisting urgency and urge incontinence, and she would be a candidate in the future for SNS.    DESCRIPTION OF PROCEDURE:  The patient was brought to the operating room and placed in supine position.  After excellent induction of general anesthesia, her perineal and abdominal areas were prepped and draped in the regular fashion.  Midline incision was made above the umbilicus.  Peritoneum was insufflated to the pressure of 15.  An 8 mm robotic trocar was placed.  Evaluation of abdominal cavity demonstrates only small adhesions.  Additional three 8 mm robotic trocars were placed throughout the abdomen.  Dr. Caldwell proceeded with the TAP block for  postoperative pain control.  A 5 mm [place] seal was placed in the right abdomen.  Xi robot was docked in.  Dr. Downs proceeded with supracervical hysterectomy and bilateral salpingo-oophorectomy, which I assisted.  Once the specimen was positioned in the EndoCatch bag, I proceeded with sacral colpopexy.  Peritoneum on top of the sacrum was opened up. I was able to see her prior placed mesh with steel Ethibond sutures intact over the sacrum.  I opened the peritoneum medial and to the left of the prior placed mesh and created a separate trough all the way to the cervix.  There was a lot of redundancy and flexibility of sigmoid and rectum.  I dissected anterior and posterior over the cervix and vaginal area and positioned polypropylene type 1 mesh from Coloplast kit called Restorelle and sutured anteriorly with interrupted 2-0 Ethibond x3 over anterior cervix with addition of 3-0 PDS interrupted over anterior vaginal wall.  Posteriorly, I also used combination of 2-0 Ethibond over posterior cervix with addition of 3-0 PDS interrupted over posterior vaginal wall.  Of note, I also cauterized the cervical os to denude that as well as closed the os prior to the mesh placement with interrupted 3-0 PDS figure-of-eight x3.  Once the graft was positioned, Dr. Caldwell also proceeded with the dissection of her rectum and positioned a polypropylene type 1 mesh from Coloplast over anterior rectum and sutured to anterior rectal wall.  After that, we adjusted both grafts and I sutured the rectopexy graft to the posterior cervix and the vagina using 3-0 PDS interrupted x3.  Once we adjusted tension-free, we sutured the both meshes to the already preplaced sutures over the sacrum. Prolene sutures x2.  Dr. Caldwell proceeded with the suture rectopexy.  In addition, please see her note for that part of the procedure.  Extra amount of the graft material was trimmed and removed.  I closed the peritoneum on top of the graft using 2-0 Vicryl  in a running fashion.  At the completion of the procedure, I performed cystoscopy that demonstrated no stitch, no mesh in the bladder or urethra.  I was able to observe efflux of urine from the left side and the right side was cannulated with no difficulty by Glidewire.    I also delivered the uterus in the EndoCatch bag through the extended midline incision and sent for specimen including uterus, bilateral fallopian tubes and ovaries to pathology.  Midline incision was closed using 0 Vicryl figure-of-eight interrupted [id number] x4.  All incisions were closed using subcutaneous 2-0 Vicryl and Monocryl and Dermabond to the skin.  The patient tolerated the procedure well and was transferred to recovery in stable condition.  Calderon was removed.  No vaginal packing placed.    Domonique Oneal MD        D: 2023   T: 2023   MT: francisca    Name:     SHERRIE WILCOX  MRN:      [id number]        Account:        [id number]   :      1960           Procedure Date: 2023     Document: [id number]

## 2023-06-28 NOTE — INTERVAL H&P NOTE
I have reviewed the surgical (or preoperative) H&P that is linked to this encounter, and examined the patient. There are no significant changes.  I met with Mrs. Mata  in the pre-induction area and once again discussed the proposed procedure, pros, cons, risks and benefits.  She is aware that the GYN portion  of the surgery will be a daVinci supracervical hysterectomy, bilateral salpingo-oophorectomy and possible rectocele repair in combination with the sacrocolpopexy, possible midurethral sling and cystoscopy and the ventral rectopexy.  She is aware that mesh material will be used for this surgery, including the risks of this product and FDA warnings.  All questions have been answered, consent forms signed and we will proceed.     Clinical Conditions Present on Arrival:  Clinically Significant Risk Factors Present on Admission

## 2023-06-28 NOTE — ANESTHESIA POSTPROCEDURE EVALUATION
Patient: Alma Mata    Procedure: Procedure(s):  robotic assisted supracervical hysterectomy,  Davinci Salpingo-Oophorectomy Including Bilateral  robotic assisted sacrocolpopexy, cystoscopy  robotic ventral rectopexy with mesh, suture rectopexy       Anesthesia Type:  General    Note:  Disposition: Inpatient   Postop Pain Control: Uneventful            Sign Out: Well controlled pain   PONV: No   Neuro/Psych: Uneventful            Sign Out: Acceptable/Baseline neuro status   Airway/Respiratory: Uneventful            Sign Out: Acceptable/Baseline resp. status   CV/Hemodynamics: Uneventful            Sign Out: Acceptable CV status; No obvious hypovolemia; No obvious fluid overload   Other NRE: NONE   DID A NON-ROUTINE EVENT OCCUR? No           Last vitals:  Vitals Value Taken Time   BP 98/67 06/28/23 1400   Temp 36.7  C (98  F) 06/28/23 1300   Pulse 66 06/28/23 1415   Resp 27 06/28/23 1415   SpO2 95 % 06/28/23 1413   Vitals shown include unvalidated device data.    Electronically Signed By: Félix Munoz MD  June 28, 2023  3:27 PM

## 2023-06-28 NOTE — ANESTHESIA PROCEDURE NOTES
Airway       Patient location during procedure: OR       Procedure Start/Stop Times: 6/28/2023 7:44 AM  Staff -        Anesthesiologist:  Félix Munoz MD       CRNA: Alexandre Olivares APRN CRNA       Performed By: CRNA  Consent for Airway        Urgency: elective  Indications and Patient Condition       Indications for airway management: sindi-procedural       Induction type:intravenous       Mask difficulty assessment: 1 - vent by mask    Final Airway Details       Final airway type: endotracheal airway       Successful airway: ETT - single and Oral  Endotracheal Airway Details        ETT size (mm): 7.0       Cuffed: yes       Cuff volume (mL): 6       Successful intubation technique: direct laryngoscopy       DL Blade Type: Rosa 2       Grade View of Cords: 1       Adjucts: stylet       Position: Right       Measured from: gums/teeth       Secured at (cm): 22       Bite block used: None    Post intubation assessment        Placement verified by: capnometry, equal breath sounds and chest rise        Number of attempts at approach: 1       Number of other approaches attempted: 0       Secured with: plastic tape       Ease of procedure: easy       Dentition: Intact and Unchanged    Medication(s) Administered   Medication Administration Time: 6/28/2023 7:44 AM

## 2023-06-28 NOTE — OP NOTE
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    PROCEDURES:  By Dr. Alexandre Downs, da Shaniqua supracervical hysterectomy, bilateral salpingo-oophorectomy; by Dr. Ashely Oneal, da Shaniqua sacral colpopexy and cystoscopy; by Dr. Lucy Caldwell, da Shaniqua ventral rectopexy with mesh, suture rectopexy.    SURGEON:  Alexandre Downs MD    ASSISTANT:  Doe Vazquez; co-surgeon, Dr. Ashely Oneal; co-surgeon, Dr. Ashley Caldwell.    ANESTHESIA:  General.    ESTIMATED BLOOD LOSS:  10 mL.    PATIENT IDENTIFICATION:  The patient is a 63-year-old patient with pelvic floor relaxation and associated complete rectal prolapse.  The patient had had 3 previous surgeries for rectal prolapse, all of which were unsuccessful.  She presented to Dr. Caldwell and subsequently to Dr. Oneal for evaluation of a minimally invasive prolapse surgery. The patient underwent defecography, which confirmed intussusception with total rectal procidentia.  She was evaluated by Dr. Oneal, who also noted mild to moderate pelvic organ prolapse, but determined that the patient would benefit from a combined minimally invasive rectopexy as well as a sacral colpopexy.  The patient then subsequently was seen by myself and after evaluation, I agreed that a combined surgical procedure would be the best options of management.  The patient returned to our office and had a pelvic ultrasound, which was normal.  Uterus was normal in size for a postmenopausal woman.  The endometrium 3.4 mL.  Neither ovary was visualized and there was no free pelvic fluid.  The patient was thoroughly counseled by all providers regarding the proposed surgery.  This included the pros, cons, risks, benefits, and potential complications.  The patient was aware that mesh material would be used during this procedure.  The  patient was also aware of the FDA warnings regarding this product.  I explained to her in detail the gynecologic portion of the surgery, which would include a da Shaniqua supracervical hysterectomy, bilateral salpingo-oophorectomy and possible rectocele repair.  She expressed understanding and agreement with the plan of care.  The morning of surgery, her questions were answered, appropriate consent forms were signed and site markings were completed.  The patient had had a preoperative history and physical by her primary care provider and she was felt to be a satisfactory candidate for the surgery.  Once these issues had been accomplished, we proceeded.    OPERATIVE FINDINGS:  At the time of examination under anesthesia, the external genitalia was normal.  The introitus was extremely narrow, admitting only a single digit.  Visualization of the cervix was quite difficult, but was accomplished.  The cervix prolapsed to just above the introitus with traction.  There was no significant cystocele or rectocele.  At the time of da Shaniqua laparoscopy, the uterus was normal in size, shape and mobility.  The ovaries were atrophic, as were the fallopian tubes.  The course of the ureters was normal and was identified throughout the entire procedure.  There was a scarring from previous rectopexy procedures in the posterior peritoneum and the mesentery of the colon.  There was an extremely deep pouch of Nasir.  At the conclusion of the procedure, the cervical stump was well suspended high in the pelvis.  The rectal prolapse had resolved, and there was normal papillae and hemorrhoidal tissue at the anal verge.  There were no complications of the procedure.  The surgical specimens included the uterus (without the cervix), bilateral tubes and ovaries.  There were no complications of the procedure.  The patient tolerated the anesthetic well and she will be admitted for postoperative care.    OPERATIVE PROCEDURE:  The patient was taken  to the operating room at the robot unit of [facility].  She was placed in the supine position.  Both intravenous and inhalation anesthesia administered, and endotracheal intubation was performed.  The patient was then placed in the dorsal lithotomy position.  Examination under anesthesia was performed with the findings documented above.  The vagina, perineum and lower abdomen were prepped and draped in the usual fashion for combined abdominal and vaginal surgery.  All instruments were prepared.  Once the patient was appropriately prepped and draped and all instruments were prepared, a timeout was held.  During this timeout, the patient's identity, date of birth, medical record number, and surgery to be performed were reviewed.  All members of the operating room staff were in agreement with the scheduled procedure and the patient's identity. From a gynecologic perspective, the patient was to have a da Shaniqua supracervical hysterectomy, bilateral salpingo-oophorectomy and possible rectocele repair.  From a Urology perspective, a da Shaniqua sacral colpopexy and cystoscopy, and finally from a colorectal perspective, a da Shaniqua rectopexy and suture rectopexy.  Once the timeout was completed, the surgery began.    I began the surgery vaginally by placing a Calderon catheter under sterile conditions.  The vaginal introitus was extremely narrow and barely fit a small Momo speculum.  Expanding the speculum was not be possible without the risk of perineal laceration.  The cervix was identified manually and the single tooth tenaculum was attached.  With traction, the cervix was brought down to just inside the vaginal introitus.  The endocervical canal was dilated to a size 6 Hegar dilator.  This allowed placement of the Hulka tenaculum, the cannula of which was covered by the end of a red Moody catheter.  The Hulka tenaculum was attached to the anterior cervical lip and the previous single tooth tenaculum was  removed.  The Hulka tenaculum was to be used for uterine manipulation during the procedure and for identification of the endocervical canal.  Meanwhile, Dr. Oneal and Paulette were placing the abdominal ports, 5 ports were placed, 2 on the patient's right lateral abdomen and 2 on the patient's left lateral abdomen, 1 above the umbilicus.  These were all 8 mL ports and one AirSeal port.  The pneumoperitoneum was created without incident and under low filling pressures.  The ports were placed through the incisions under direct visualization and there was no evidence of injury.  Once the ports were in place, the da Shaniqua Xi robot was brought into the surgical field.  The surgical table was lowered to its lowest position.  A second-degree Trendelenburg was applied.  The da Shaniqua Xi robot was then docked.  First, the Arm #3 was used to place the laparoscope and perform tissue targeting.  Once this was affirmative, the remaining arms were docked.  It should be mentioned that the patient had an orogastric tube that had been placed prior to the timeout.  Once the da Shaniqua robot had been docked, I took my position at the da Shaniqua console.  The da Shaniqua instruments were placed under direct visualization.  Arm #1 was a Cadiere device.  Arm #2 was the bipolar forceps.  Arm #3 was the laparoscope.  Arm #4 was the da Shaniqua monopolar scissors.  Once the instruments were all placed safely, I took control of the instruments at the da Shaniqua console.  Dr. Oneal then took her position at the patient's perineum to assist with the hysterectomy.  The anatomy of the abdomen and pelvis was thoroughly examined and was as documented above.  The course of the ureters was noted.  Once a complete and thorough examination had been performed, the surgery began.  The infundibulopelvic ligaments were cauterized and cut bilaterally.  Dissection was carried along the mesosalpinx to the level of the round ligament.  The round ligaments were  cauterized on both sides.  This allowed incision of the anterior leaf of the broad ligament from both sides and meeting in the midline at the level of the cervix.  The bladder was dissected away from the lower uterine segment and the apex of the vagina.  Continued dissection along both sides of the uterus was accomplished, including the uterine vascular pedicles.  Dissection was carried down to below the level of the internal os.  The uterosacral ligaments were spared.  Amputation of the specimen was then performed using the monopolar scissors.  A circumferential incision was made in the cervix, starting anteriorly and extending on both sides.  The cannula of the Hulka tenaculum was identified during this process covered by the tip of a red Moody catheter.  Once the transection had nearly completed, the Hulka tenaculum was hinged and the remaining cervix was incised.  The uterine specimen with tubes and ovaries attached was then placed into a 5 mL EndoCatch bag.  Hemostasis was present on the cervical stump.  The Hulka tenaculum was removed, and there was no significant bleeding.  The vaginal introitus did not accommodate a stick sponge.  For this reason, a #8 Hegar dilator was used to manipulate the cervix.  The endocervical canal was cauterized using the bipolar cautery forceps.  Complete hemostasis was assured.  As noted above, the uterine specimen without the cervix, but with tubes and ovaries attached was placed into an EndoCatch bag.  Dr. Oneal then took her position at the da Shaniqua console and I took my position at the patient's perineum after re-scrubbing and gowning.  I assisted Dr. Oneal with retraction during the sacral colpopexy as well as determining appropriate tension on the sacral colpopexy stitch.  Dr. Oneal then began the process of the sacral colpopexy.  Once she had identified the anterior longitudinal ligament and the dissection of the retroperitoneal space, she then turned to the  da Shaniqua robot over to Dr. Caldwell.  Dr. Caldwell then continued the dissection into the posterior cul-de-sac and in the perirectal tissues.  Inder Caldwell and Kimmy then collectively performed the ventral rectopexy with mesh and suture rectopexy as well as the sacral colpopexy.  During this process, I assisted with retraction and determination of proper tension for the repair.  Once these procedures were concluded, the da Shaniqua Xi robot was undocked.  The patient was returned to flat from Trendelenburg.  At this point of the surgical procedure, I left the operating room and Dr. Oneal was to remove the surgical specimen through the supraumbilical incision, which she subsequently did.  As I concluded the gynecologic portion of the procedure, a debriefing was held, during which the patient's identity and the surgery that was performed were reviewed.  Namely from a gynecologic perspective the da Shaniqua supracervical hysterectomy and bilateral salpingo-oophorectomy.  Blood loss for the procedure was 10 mL.  The surgical specimen was uterus without the cervix, bilateral tubes and ovaries.  The patient tolerated the procedure well.  There were no intraoperative complications.  Inder Oneal and Paulette then completed the surgical procedure including removal of the specimen within the EndoCatch bag and the procedure concluded.  The patient will be monitored in the recovery room and admitted as outpatient process with anticipated discharge within 24 hours.  The debriefing being completed, the surgery concluded without apparent complications.  The patient was in excellent condition.    Alexandre Downs MD        D: 2023   T: 2023   MT: ryder    Name:     SHERRIE WILCOXSanta  MRN:      [id number]        Account:        [id number]   :      1960           Procedure Date: 2023     Document: [id number]

## 2023-06-28 NOTE — ANESTHESIA PREPROCEDURE EVALUATION
Prior to Admission medications    Medication Sig Start Date End Date Taking? Authorizing Provider   calcium-vitamin D 500-125 MG-UNIT TABS Take 1 tablet by mouth daily    Yes Reported, Patient   diphenhydrAMINE (BENADRYL) 25 MG tablet Take 25 mg by mouth nightly as needed for itching (saesonal allergies)   Yes Reported, Patient   fluticasone (FLONASE) 50 MCG/ACT nasal spray Spray 1 spray into both nostrils daily as needed (seasonal allergies)   Yes Reported, Patient   latanoprost (XALATAN) 0.005 % ophthalmic solution Place 1 drop into both eyes every evening  1/1/18  Yes Reported, Patient   MULTI-VITAMIN OR TABS Take 1 tablet by mouth daily  1/14/09  Yes Dayami Cruz MD   timolol maleate (TIMOPTIC) 0.5 % ophthalmic solution Place 1 drop into both eyes every morning  1/1/18  Yes Reported, Patient     Current Facility-Administered Medications Ordered in Epic   Medication Dose Route Frequency Last Rate Last Admin     ceFAZolin Sodium (ANCEF) injection 2 g  2 g Intravenous Pre-Op/Pre-procedure x 1 dose         ceFAZolin Sodium (ANCEF) injection 2 g  2 g Intravenous See Admin Instructions         cefOXitin (MEFOXIN) 2 g vial to attach to  mL bag  2 g Intravenous Q8H         lactated ringers infusion   Intravenous Continuous 100 mL/hr at 06/28/23 0635 New Bag at 06/28/23 0635     lidocaine (LMX4) cream   Topical Q1H PRN         lidocaine 1 % 0.1-1 mL  0.1-1 mL Other Q1H PRN         metroNIDAZOLE (FLAGYL) infusion 500 mg  500 mg Intravenous Pre-Op/Pre-procedure x 1 dose   500 mg at 06/28/23 0636     ondansetron (ZOFRAN) injection 4 mg  4 mg Intravenous Once         Provider ordered ALTERNATE pre op antibiotic.  1 each As instructed Continuous         sodium chloride (PF) 0.9% PF flush 3 mL  3 mL Intracatheter Q8H         sodium chloride (PF) 0.9% PF flush 3 mL  3 mL Intracatheter q1 min prn         No current [facility]-ordered outpatient medications on file.       lactated ringers 100 mL/hr at 06/28/23 0635      Another Antibiotic has been ordered.       No results for input(s): ABO, RH in the last 95310 hours.  No results for input(s): HCG in the last 67511 hours.  No results found for this or any previous visit (from the past 744 hour(s)).Anesthesia Pre-Procedure Evaluation    Patient: Alma Mata   MRN: [id number] : 1960        Procedure : Procedure(s):  robotic assisted supracervical hysterectomy,  Davinci Salpingo-Oophorectomy Including Bilateral  robotic assisted sacrocolpopexy, cystoscopy  robotic ventral rectopexy with mesh, possible suture rectopexy  POSSIBLE RECTOCELE REPAIR          Past Medical History:   Diagnosis Date     Arthritis     slight arthritis in right hand     CARDIOVASCULAR SCREENING; LDL GOAL LESS THAN 160 2/10/2010     Motion sickness      PONV (postoperative nausea and vomiting)      Subclinical hyperthyroidism       Past Surgical History:   Procedure Laterality Date     COLONOSCOPY  2009     COLONOSCOPY N/A 2023    Procedure: Colonoscopy (CRSAL);  Surgeon: Lucy Caldwell MD;  Location:  GI     DAVINCI RECTOPEXY N/A 2020    Procedure: ROBOTIC ASSISTED RECTOPEXY WITH MESH AND PROCTOSCOPY;  Surgeon: Jo Ann Kothari MD;  Location: [facility] OR     GI SURGERY  2009    Surgery for prolapsed rectum     SURGICAL HISTORY OF -   2003    Left thumb surgery after laceration      No Known Allergies   Social History     Tobacco Use     Smoking status: Never     Smokeless tobacco: Never   Substance Use Topics     Alcohol use: No      Wt Readings from Last 1 Encounters:   23 63 kg (138 lb 14.4 oz)        Anesthesia Evaluation   Pt has had prior anesthetic.     History of anesthetic complications  - motion sickness and PONV.      ROS/MED HX  ENT/Pulmonary:    (-) tobacco use   Neurologic:       Cardiovascular:       METS/Exercise Tolerance:     Hematologic:       Musculoskeletal:       GI/Hepatic:       Renal/Genitourinary:       Endo:     (+) thyroid problem,  hypothyroidism,     Psychiatric/Substance Use:       Infectious Disease:       Malignancy:       Other:            Physical Exam    Airway        Mallampati: I    Neck ROM: full     Respiratory Devices and Support         Dental       (+) Minor Abnormalities - some fillings, tiny chips      Cardiovascular   cardiovascular exam normal          Pulmonary   pulmonary exam normal                OUTSIDE LABS:  CBC:   Lab Results   Component Value Date    WBC 3.8 (L) 06/02/2023    WBC 7.1 05/09/2020    HGB 14.6 06/02/2023    HGB 10.3 (L) 05/09/2020    HCT 44.7 06/02/2023    HCT 32.5 (L) 05/09/2020     06/02/2023     05/09/2020     BMP:   Lab Results   Component Value Date     06/02/2023     (H) 05/09/2020    POTASSIUM 4.8 06/02/2023    POTASSIUM 3.7 05/09/2020    CHLORIDE 105 06/02/2023    CHLORIDE 112 (H) 05/09/2020    CO2 26 06/02/2023    CO2 26 05/09/2020    BUN 11.2 06/02/2023    BUN 7 05/09/2020    CR 0.79 06/02/2023    CR 0.66 05/09/2020    GLC 96 06/02/2023    GLC 80 05/09/2020     COAGS: No results found for: PTT, INR, FIBR  POC: No results found for: BGM, HCG, HCGS  HEPATIC:   Lab Results   Component Value Date    ALBUMIN 4.2 06/02/2023    PROTTOTAL 6.7 06/02/2023    ALT 35 06/02/2023    AST 28 06/02/2023    ALKPHOS 66 06/02/2023    BILITOTAL 0.3 06/02/2023     OTHER:   Lab Results   Component Value Date    JAMAR 9.7 06/02/2023    PHOS 2.8 05/09/2020    MAG 2.0 05/09/2020    TSH 0.63 11/17/2016    T4 1.18 04/21/2011    T3 97 04/21/2011       Anesthesia Plan    ASA Status:  2   NPO Status:  NPO Appropriate    Anesthesia Type: General.     - Airway: ETT   Induction: Intravenous.   Maintenance: Balanced.   Techniques and Equipment:     - Airway: Video-Laryngoscope         Consents    Anesthesia Plan(s) and associated risks, benefits, and realistic alternatives discussed. Questions answered and patient/representative(s) expressed understanding.    - Discussed:     - Discussed with:  Patient          Postoperative Care    Pain management: IV analgesics.   PONV prophylaxis: Ondansetron (or other 5HT-3), Dexamethasone or Solumedrol     Comments:                Félix Munoz MD

## 2023-06-28 NOTE — ANESTHESIA CARE TRANSFER NOTE
Patient: Alma Mata    Procedure: Procedure(s):  robotic assisted supracervical hysterectomy,  Davinci Salpingo-Oophorectomy Including Bilateral  robotic assisted sacrocolpopexy, cystoscopy  robotic ventral rectopexy with mesh, suture rectopexy       Diagnosis: Female genital prolapse, unspecified type [N81.9]  Retention of urine, unspecified [R33.9]  Rectal prolapse [K62.3]  Diagnosis Additional Information: No value filed.    Anesthesia Type:   General     Note:    Oropharynx: spontaneously breathing and oropharynx clear of all foreign objects  Level of Consciousness: drowsy  Oxygen Supplementation: face mask  Level of Supplemental Oxygen (L/min / FiO2): 8 LPM  Independent Airway: airway patency satisfactory and stable  Dentition: dentition unchanged  Vital Signs Stable: post-procedure vital signs reviewed and stable  Report to RN Given: handoff report given  Patient transferred to: PACU    Handoff Report: Identifed the Patient, Identified the Reponsible Provider, Reviewed the pertinent medical history, Discussed the surgical course, Reviewed Intra-OP anesthesia mangement and issues during anesthesia, Set expectations for post-procedure period and Allowed opportunity for questions and acknowledgement of understanding      Vitals:  Vitals Value Taken Time   BP 94/63 06/28/23 1300   Temp     Pulse 58 06/28/23 1304   Resp 12 06/28/23 1304   SpO2 100 % 06/28/23 1304   Vitals shown include unvalidated device data.    Electronically Signed By: ABHAY Montano CRNA  June 28, 2023  1:05 PM

## 2023-06-28 NOTE — PROGRESS NOTES
Dr. Munoz approved pt to Tx to 2218.  A&Ox4, RA--lungs clear, minimal crepitus upper Rt chest, Dr. Munoz aware, Tele: SR/SB--no ectopy, wells removed in OR, CMS intact, incisions c/d/i, 1/10 pain, comfortable, tolerating ice chips.  Sister updated.

## 2023-06-28 NOTE — BRIEF OP NOTE
[facility] Brief Operative Note    Pre-operative diagnosis: Female genital prolapse, unspecified type [N81.9]  Retention of urine, unspecified [R33.9]  Rectal prolapse [K62.3]   Post-operative diagnosis same   Procedure: Procedure(s):  robotic assisted supracervical hysterectomy,  Davinci Salpingo-Oophorectomy Including Bilateral  robotic assisted sacrocolpopexy, cystoscopy  robotic ventral rectopexy with mesh, suture rectopexy   Surgeon: Domonique Oneal MD   Assistants(s): Doe Vazquez PAC   Estimated blood loss: Less than 50 ml    Specimens: Uterus/bila fallopian tubes/ jonathon ovaries    Findings:

## 2023-06-28 NOTE — OP NOTE
PREOPERATIVE DIAGNOSIS:   Thickness rectal prolapse  Enterocele  Pelvic organ prolapse    POSTOPERATIVE DIAGNOSIS: Same.     OPERATION PERFORMED:   1. Robotic ventral rectopexy with exclusion of the small bowel from the pelvis using mesh (Dr. Caldwell)  2.  Suture rectopexy (Dr. Caldwell)  3. Robotic sacrocolpopexy and cystoscopy (Dr. Oneal)  4. Robotic hysterectomy and bilateral salpingo-oophorectomy (Dr. Downs)    SURGEON: Selene Caldwell MD   ASSISTANT: Stephane Camara PA-C,  COSURGEON: Domonique Oneal MD (assist: Doe Vazquez PA-C)  COSURGEON: MD Stephane Casanova' assistance was critical to this procedure.  He assisted with retraction, suctioning, holding of the camera as well as suture cutting and wound closure.      ANESTHESIA: General.     INDICATION: Alma Mata is a 63 year old female with a history of rectal prolapse.  She had her first prolapse repair approximately 12 years ago.  She had a laparoscopic rectopexy but had a recurrence on postop day 1.  She then had a recurrence of her prolapse back in 2020 underwent a robotic ventral rectopexy with mesh.  Unfortunately she has again developed a recurrence of her prolapse and has some anterior middle compartment prolapse as well.  She has been evaluated by Dr. Oneal as well as Dr. Downs, and there are plans for a combo repair of her multicompartment prolapse.  Defecography confirmed the presence of internal intussusception that progresses into full-thickness rectal prolapse, with a large enterocele. She has combined anterior, middle and posterior pelvic compartment prolapse. The risks and benefits of proceeding with a robotic ventral rectopexy and sacrocolpopexy have been discussed with Alma Mata and she would like to proceed.     OPERATIVE FINDINGS: Prior to induction of anesthesia the patient had an obvious full-thickness rectal prolapse of at least 6 cm.  There was this during the pelvis from her multiple prior  procedures.  The previously placed ventral rectopexy mesh was not present onto the anterior wall of the rectum.  Is running along the right lateral pelvic sidewall.  Dr. Downs performed a supracervical hysterectomy and bilateral salpingo-oophorectomy.  The patient has a very deep pouch of Nasir. A ventral rectopexy and suture rectopexy repair was performed using permanent, light weight prolene, mesh. The mesh was sutured to the levator muscles and anterior rectum, then Dr. Oneal secured an additional piece of mesh to the vagina apex and sacral promontory. The mesh was completely reperitonealized. Cystoscopy was performed by Dr. Oneal and was normal.    PROCEDURE IN DETAIL: After informed consent was obtained, the patient was brought to the operating room and placed in the supine position on the operating room table. Sequential compression devices were placed on bilateral lower extremities prior to induction, and general anesthesia was induced without difficulty. She was placed in a well-padded dorsal lithotomy position and IV antibiotics were administered. A Pigazzi pink pad was used on the operating room table to prevent her from sliding off the table in steep Trendelenburg position. Her abdomen was sterilely prepped and draped in standard fashion.    Dr. Oneal then placed a wells catheter without difficulty. A 8 mm supraumbilical vertical incision was made in the skin with a #11 blade for placement of the camera port. A Veress needle was inserted into the abdominal cavity through this incision. After a positive saline drop test, the abdomen was insufflated with CO2 gas to a pressure of 15 mmHg. A 8 mm bladeless robotic trocar was inserted into the abdominal cavity through the supraumbilical port incision. A 8 mm 30-degree robotic scope was inserted into the abdomen, and the abdomen was explored. The operative findings are noted above.  Bilateral tap blocks were performed.  A total of 30 mL of half  percent Marcaine were injected into the 4 abdominal wall, quadrants.  Four additional bladeless trocars were inserted into the abdominal cavity in the following locations after instillation of 0.5% Marcaine in the abdominal wall.   1. An 8 mm robotic port placed in the mid clavicular line on the right side of the abdomen at least 8 cm lateral to the robotic camera port.   2. An 8 mm robotic port was placed in the mid clavicular line on the left side of the abdomen at least 8 cm lateral to the supraumbilical camera port.   3. An 8 mm robotic port was placed 4 cm above the left superior iliac crest and 8 cm lateral from the other robotic port on the left side of the abdomen.   4. A 5 mm AirSeal assistant port was placed on the right side of the abdomen 5 cm lateral and just inferior to the right sided robotic port.     The patient was placed in steep Trendelenburg position. The [employer] XI robot was docked to the 4 robotic ports. Stephane Camara remained as the beside assist while I worked at the robot console. The three robotic instruments were inserted into the abdominal cavity under direct visualization. The robotic 30 degree laparoscope was used. The sigmoid colon and small bowel was reflected completely out of the pelvis in order to better view the pelvic floor.  The patient has a very deep pouch of Nasir, and a lot of laxity in pelvic floor tissues.    Dr. Downs performed a robotic hysterectomy and bilateral salpingo-oophorectomy. Once this was completed, Dr. Oneal began dissection for a sacrocolpopexy.   The peritoneum distal to where the inferior mesenteric artery was identified was opened using cautery with robotic scissors in robot arm #1. Eventually, the sacral promontory and the anterior longitudinal ligament was clearly visible and suitable for the mesh to eventually be secured in this location. Two 0-prolene sutures were pre-placed in the anterior longitudinal ligament in preparation of final mesh  fixation and the needles were cut off. The peritoneum was then opened in continuation along the right side of the rectum within the avascular plane.  This is just medial to the prior ventral rectopexy mesh.  Once the peritoneal reflection had been opened along the right side, this was carried down in the avascular plane, but the dissection did not proceed completely posteriorly. The lateral ligaments remained intact. Dr. Oneal raised flaps of peritoneum anteriorly and inferiorly over the vaginal apex.     I performed the deep pelvic dissection within the rectovaginal septum, working independently at the robot console. I continued to raise the flap inferiorly along the posterior wall of the vagina into the rectovaginal septum. Prior to opening the peritoneal reflection of the rectovaginal septum, the vagina was retracted towards the bladder using a vaginal retracting device.There was a large amount of redundancy and laxity in the tissue in this plane. Dissection proceeded within the rectovaginal septum for approximately 5-6 cm, down to the level of the levator muscles. The levator muscles were exposed on both sides of the rectum.  The dissection was carried down to the pelvic floor to ensure adequate repair of rectal prolapse.  The rectal width at the level of the levator muscles measured 6 cm.  Once the peritoneal reflection had been opened along the right side, this was carried down in the avascular plane. The dissection then proceeded posteriorly all the way down to the levator muscles, and the left lateral ligaments were left intact.    The light weight, permanent mesh placed through the 5 mm right upper quadrant port and laid flat within the pelvis, with the distal portion extending over the anterior portion of the rectum at the level of the levator muscles.   In preparation for sewing the mesh into place, a needle  was placed in robotic arm #2 and robotic arm #1. Stephane Camara, bedside assist, was  able to pass 2-0 Ethibond sutures through the 5 mm assistant port, and 2-0 Ethibond suture was then used to secure the mesh to the levators on both sides of the rectum anteriorly. Eight additional 2-0 Vicryl sutures and 3-0 PDS sutures were then used to secure the mesh to the anterior portion of the rectum, progressing proximally on the rectum. These sutures were placed in seromuscular layers of the rectum.  The mesh was secured to the anterior rectal wall at the most distal extent to ensure repair of rectocele.  The mesh was not on tension across the distal rectum. This completed the ventral rectopexy portion of the mesh placement.    Dr. Oneal then performed a sacrocolpopexy by securing the y-portion of the mesh to the vaginal apex. There was appropriate laxity between the sutures on the anterior rectal wall and the sutures to the posterior vagina, without undo tension. Two 2-0 Prolene sutures were used to secure the mesh at the pre-cleared location on the sacral promontory. These stitches were secured to the anterior longitudinal ligament. The mesh sat comfortably within the pelvis. We then performed a posterior suture rectopexy. The rectal mesentery was retracted out of the pelvis, and a point in the mid rectum selected for the rectopexy. Two 2-0 Prolene sutures were placed through the rectal mesentery just lateral to the bowel wall, through the anterior longitudinal ligament and the pelvic mesh, and back through the mesentery in a mattress fashion. These were then secured, completing the suture rectopexy.  The mesh was completely reperitonealized by closing the peritoneum using 2-0 Vicryl suture.    Dr. Oneal rescrubbed into the procedure to remove the uterus and ovaries from the abdominal cavity by making the supraumbilical port site slightly longer.  All robotic instruments were removed from the abdomen and the robot was undocked.  Dr. Oneal closed the fascia of the supraumbilical port with 0  vicryl figure of eight suture. The robotic port sites were closed with interrupted 4-0 Monocryl stitches and Dermabond.  Dr. Oneal performed cystoscopy.     The patient tolerated this procedure well, without complications. Estimated blood loss was 10 mL. I was present and scrubbed for my portion of this operation. The patient was returned to the supine position, extubated in the operating room, and brought to the recovery room in stable condition.       MITCHELL KILLIAN MD

## 2023-06-28 NOTE — BRIEF OP NOTE
[facility]    Brief Operative Note    Pre-operative diagnosis: Female genital prolapse, unspecified type [N81.9]  Retention of urine, unspecified [R33.9]  Rectal prolapse [K62.3]  Post-operative diagnosis same    Procedure: Procedure(s):  robotic assisted supracervical hysterectomy,  Davinci Salpingo-Oophorectomy Including Bilateral  robotic assisted sacrocolpopexy, cystoscopy  robotic ventral rectopexy with mesh, suture rectopexy  Surgeon: Surgeon(s) and Role:  Panel 1:     * Alexandre Downs MD - Primary  Panel 2:     * Domonique Oneal MD - Primary     * Doe Vazquez PA-C - Assisting  Panel 3:     * Lucy Caldwell MD - Primary     * Stephane Camara PA-C - Assisting  Anesthesia: General   Estimated Blood Loss: 10 mL    Drains: None  Specimens:   ID Type Source Tests Collected by Time Destination   1 : Uterus, bilateral tubes, ovaries Tissue Uterus, Bilateral Fallopian Tubes & Ovaries SURGICAL PATHOLOGY EXAM Alexandre Downs MD 6/28/2023 12:01 PM      Findings:   Deep pouch of Nasir, pelvic scarring from prior surgeries.  Complications: None.  Implants:   Implant Name Type Inv. Item Serial No.  Lot No. LRB No. Used Action   MESH SLING FLAT RESTORELLE 24X8CM [id number] - [id number] Mesh MESH SLING FLAT RESTORELLE 24X8CM [id number]  COLOPLAST [id number] N/A 1 Implanted   MESH SLING Y SHAPE RESTORELLE 24X4CM [id number] - [id number] Mesh MESH SLING Y SHAPE RESTORELLE 24X4CM [id number]  COLOPLAST [id number] N/A 1 Implanted       Condition on discharge from OR: Satisfactory    Stephane Camara PA-C   [facility]   [phone].        ADDENDUM:    PATIENT DATA  Indicate Y or N:  Home O2 No  Hemodialysis  No  Transplant patient  No  Cirrhosis  No  Steroids in last 30 days  No  Immunomodulators in last 30 days  No  Anticoagulation at time of surgery  No   List medication N/A  Prior abdominal surgery  Yes  Pelvic irradiation  No    Albumin within 30 days if  known 4.2   Hgb within 30 days if known 14.6   Hemoglobin   Date Value Ref Range Status   06/02/2023 14.6 11.7 - 15.7 g/dL Final   05/09/2020 10.3 (L) 11.7 - 15.7 g/dL Final   ]  Cr within 30 days if known 0.79   Creatinine   Date Value Ref Range Status   06/02/2023 0.79 0.51 - 0.95 mg/dL Final   05/09/2020 0.66 0.52 - 1.04 mg/dL Final   ]  Body mass index is 20.51 kg/m .      OR DATA  Emergent  No   <24 hours  No   <1 week  No  Bowel Prep Yes  Antibiotics  Yes  DVT prophylaxis    Heparin  No   SCD  Yes   None  No  Drain  No  ASA (1,2,3,4) 2  OR time (min) 245  Stents  No  Transfuse >/= 2U  No  Anastomosis   Stapled  No   Handsewn  No  Leak Test    Positive  No   Negative  No   Not done  Yes

## 2023-06-28 NOTE — BRIEF OP NOTE
[facility]    Brief Operative Note    Pre-operative diagnosis: Female genital prolapse, unspecified type [N81.9]  Retention of urine, unspecified [R33.9]  Rectal prolapse [K62.3]  Post-operative diagnosis same    Procedure: Procedure(s):  robotic assisted supracervical hysterectomy,  Davinci Salpingo-Oophorectomy Including Bilateral  robotic assisted sacrocolpopexy, cystoscopy  robotic ventral rectopexy with mesh, suture rectopexy  Surgeon: Surgeon(s) and Role:  Panel 1:     * Aelxandre Downs MD - Primary  Panel 2:     * Domonique Oneal MD - Primary     * Doe Vazquez PA-C - Assisting  Panel 3:     * uLcy Caldwell MD - Primary     * Stephane Camara PA-C - Assisting  Anesthesia: General   Estimated Blood Loss: 10 ml    Drains: None  Specimens: Uterus (without the cervix) bilateral tubes and ovaries  Findings: Total rectal prolapse.Deep pouch of Nasir,  vaginal introitus narrow, admits a single digit.  Cervix prolapses to the introitus with traction.  At laparoscopy, uterus is normal in size, shape and mobile.   Atrophic tubes and ovaries.  Normal course of ureters.  At cystoscopy, bladder normal.  Appendix normal, upper abdomen normal.     Complications: none .  Implants:   Implant Name Type Inv. Item Serial No.  Lot No. LRB No. Used Action   MESH SLING FLAT RESTORELLE 24X8CM [id number] - [id number] Mesh MESH SLING FLAT RESTORELLE 24X8CM [id number]  COLOPLAST [id number] N/A 1 Implanted   MESH SLING Y SHAPE RESTORELLE 24X4CM [id number] - [id number] Mesh MESH SLING Y SHAPE RESTORELLE 24X4CM [id number]  COLOPLAST [id number] N/A 1 Implanted

## 2023-06-28 NOTE — PLAN OF CARE
POD 0 from a hysterectomy, sacropexy, & rectopexy. A&O. CMS intact. Bowel sounds +x4, - flatus, only wanting ice chips & sips of water, nausea decreased with zofran. VSS. Lap sites WDL, approximated with liquid bandage. Up with A1, GB to BR, unable to void with bladder scat of 84 mL. C/o minimal pain, declines interventions. Possible discharge home tomorrow.

## 2023-06-29 VITALS
SYSTOLIC BLOOD PRESSURE: 104 MMHG | DIASTOLIC BLOOD PRESSURE: 70 MMHG | TEMPERATURE: 99.2 F | RESPIRATION RATE: 16 BRPM | OXYGEN SATURATION: 94 % | BODY MASS INDEX: 20.57 KG/M2 | HEART RATE: 69 BPM | WEIGHT: 138.9 LBS | HEIGHT: 69 IN

## 2023-06-29 LAB
ANION GAP SERPL CALCULATED.3IONS-SCNC: 11 MMOL/L (ref 7–15)
BUN SERPL-MCNC: 7.8 MG/DL (ref 8–23)
CALCIUM SERPL-MCNC: 8.9 MG/DL (ref 8.8–10.2)
CHLORIDE SERPL-SCNC: 103 MMOL/L (ref 98–107)
CREAT SERPL-MCNC: 0.77 MG/DL (ref 0.51–0.95)
DEPRECATED HCO3 PLAS-SCNC: 24 MMOL/L (ref 22–29)
ERYTHROCYTE [DISTWIDTH] IN BLOOD BY AUTOMATED COUNT: 12.9 % (ref 10–15)
GFR SERPL CREATININE-BSD FRML MDRD: 86 ML/MIN/1.73M2
GLUCOSE BLDC GLUCOMTR-MCNC: 94 MG/DL (ref 70–99)
GLUCOSE SERPL-MCNC: 95 MG/DL (ref 70–99)
HCT VFR BLD AUTO: 40.1 % (ref 35–47)
HGB BLD-MCNC: 13.1 G/DL (ref 11.7–15.7)
MAGNESIUM SERPL-MCNC: 2 MG/DL (ref 1.7–2.3)
MCH RBC QN AUTO: 29.5 PG (ref 26.5–33)
MCHC RBC AUTO-ENTMCNC: 32.7 G/DL (ref 31.5–36.5)
MCV RBC AUTO: 90 FL (ref 78–100)
PATH REPORT.COMMENTS IMP SPEC: NORMAL
PATH REPORT.COMMENTS IMP SPEC: NORMAL
PATH REPORT.FINAL DX SPEC: NORMAL
PATH REPORT.GROSS SPEC: NORMAL
PATH REPORT.MICROSCOPIC SPEC OTHER STN: NORMAL
PATH REPORT.RELEVANT HX SPEC: NORMAL
PHOSPHATE SERPL-MCNC: 2.1 MG/DL (ref 2.5–4.5)
PHOTO IMAGE: NORMAL
PLATELET # BLD AUTO: 257 10E3/UL (ref 150–450)
POTASSIUM SERPL-SCNC: 4.2 MMOL/L (ref 3.4–5.3)
RBC # BLD AUTO: 4.44 10E6/UL (ref 3.8–5.2)
SODIUM SERPL-SCNC: 138 MMOL/L (ref 136–145)
WBC # BLD AUTO: 8.1 10E3/UL (ref 4–11)

## 2023-06-29 PROCEDURE — 83735 ASSAY OF MAGNESIUM: CPT | Performed by: COLON & RECTAL SURGERY

## 2023-06-29 PROCEDURE — 36415 COLL VENOUS BLD VENIPUNCTURE: CPT | Performed by: COLON & RECTAL SURGERY

## 2023-06-29 PROCEDURE — 84100 ASSAY OF PHOSPHORUS: CPT | Performed by: COLON & RECTAL SURGERY

## 2023-06-29 PROCEDURE — 250N000013 HC RX MED GY IP 250 OP 250 PS 637: Performed by: INTERNAL MEDICINE

## 2023-06-29 PROCEDURE — 82310 ASSAY OF CALCIUM: CPT | Performed by: COLON & RECTAL SURGERY

## 2023-06-29 PROCEDURE — 85027 COMPLETE CBC AUTOMATED: CPT | Performed by: COLON & RECTAL SURGERY

## 2023-06-29 PROCEDURE — 250N000009 HC RX 250: Performed by: COLON & RECTAL SURGERY

## 2023-06-29 PROCEDURE — 258N000003 HC RX IP 258 OP 636: Performed by: COLON & RECTAL SURGERY

## 2023-06-29 PROCEDURE — 250N000011 HC RX IP 250 OP 636: Mod: JZ | Performed by: COLON & RECTAL SURGERY

## 2023-06-29 RX ORDER — ESTRADIOL 0.1 MG/G
CREAM VAGINAL
Qty: 42 G | Refills: 0 | Status: SHIPPED | OUTPATIENT
Start: 2023-06-29

## 2023-06-29 RX ADMIN — METRONIDAZOLE 500 MG: 500 INJECTION, SOLUTION INTRAVENOUS at 05:01

## 2023-06-29 RX ADMIN — TIMOLOL MALEATE 1 DROP: 5 SOLUTION/ DROPS OPHTHALMIC at 08:24

## 2023-06-29 RX ADMIN — SODIUM CHLORIDE, POTASSIUM CHLORIDE, SODIUM LACTATE AND CALCIUM CHLORIDE: 600; 310; 30; 20 INJECTION, SOLUTION INTRAVENOUS at 02:20

## 2023-06-29 RX ADMIN — POTASSIUM & SODIUM PHOSPHATES POWDER PACK 280-160-250 MG 1 PACKET: 280-160-250 PACK at 10:07

## 2023-06-29 RX ADMIN — CEFAZOLIN 1 G: 1 INJECTION, POWDER, FOR SOLUTION INTRAMUSCULAR; INTRAVENOUS at 02:31

## 2023-06-29 ASSESSMENT — ACTIVITIES OF DAILY LIVING (ADL)
ADLS_ACUITY_SCORE: 20

## 2023-06-29 NOTE — PROGRESS NOTES
[facility]  PROGRESS NOTE    June 29, 2023  Post-op Day # 1    SUBJECTIVE:  Doing well, minimal pain. + Gas & small BM. Voiding and ambulating. AVSS. Phos 2.1.    OBJECTIVE:  Temp:  [97.1  F (36.2  C)-99.2  F (37.3  C)] 99.2  F (37.3  C)  Pulse:  [50-79] 69  Resp:  [12-21] 16  BP: ()/(50-70) 104/70  SpO2:  [92 %-100 %] 94 %    Intake/Output Summary (Last 24 hours) at 6/29/2023 0852  Last data filed at 6/28/2023 2300  Gross per 24 hour   Intake 2640 ml   Output 310 ml   Net 2330 ml       GENERAL:  Awake, alert, no acute distress  HEAD: Normocephalic atraumatic  SCLERA: Anicteric  EXTREMITIES: Warm and well perfused  ABDOMEN:  Soft, appropriately tender, non-distended. No guarding, rigidity, or peritoneal signs.  INCISION:  C/d/i    LABS:  Lab Results   Component Value Date    WBC 8.1 06/29/2023    WBC 7.1 05/09/2020     Lab Results   Component Value Date    HGB 13.1 06/29/2023    HGB 10.3 05/09/2020     Lab Results   Component Value Date    HCT 40.1 06/29/2023    HCT 32.5 05/09/2020     Lab Results   Component Value Date     06/29/2023     05/09/2020     Last Basic Metabolic Panel:  Lab Results   Component Value Date     06/29/2023     05/09/2020      Lab Results   Component Value Date    POTASSIUM 4.2 06/29/2023    POTASSIUM 3.7 05/09/2020     Lab Results   Component Value Date    CHLORIDE 103 06/29/2023    CHLORIDE 112 05/09/2020     Lab Results   Component Value Date    JAMAR 8.9 06/29/2023    JAMAR 8.2 05/09/2020     Lab Results   Component Value Date    CO2 24 06/29/2023    CO2 26 05/09/2020     Lab Results   Component Value Date    BUN 7.8 06/29/2023    BUN 7 05/09/2020     Lab Results   Component Value Date    CR 0.77 06/29/2023    CR 0.66 05/09/2020     Lab Results   Component Value Date    GLC 95 06/29/2023    GLC 94 06/29/2023    GLC 80 05/09/2020       ASSESSMENT/PLAN: 62 yo F with rectal prolapse & pelvic organ prolapse POD#1 s/p robotic ventral rectopexy, suture  rectopexy, supracervical hysterectomy & BSO, and sacrocolpopexy. Stable.     - Regular diet  - PRN pain meds  - Stop IVF  - Phos replacement  - Bowel regimen PRN after discharge to avoid straining  - OOB, ambulate  - Ok to discharge today after phos replacement    Discussed with Dr. Caldwell.    For questions/paging, please contact the CRS office at [phone].    Stephane Camara PA-C  Colorectal Physician Assistant    [facility]  [address] [place] [address] [place] [address]  T: [phone]  F: [phone]

## 2023-06-29 NOTE — PROGRESS NOTES
A/Ox4. VSS on RA. Denies pain. Up independent in room. Tolerating regular diet well. Voiding without difficulty. BS active, pt states passing gas. Lap sites WDL.     Discharging home today. AVS given, questions answered. Medications from discharge pharmacy given and sent with pt. All other needs met.

## 2023-06-29 NOTE — PLAN OF CARE
POD 1 hysterectomy, sacropexy, & rectopexy. A&Ox4, VSS on RA. PIV infusing LR 75 ml/hr w/ int abx. Tolerating full liquid diet. SBA w/ GB, voiding in the BR, PVR completed every void. 5 abd lap sites w/ liquid bandages CDI. Denies pain & N/V. Discharge pending for today.

## 2023-06-29 NOTE — DISCHARGE SUMMARY
[facility] Discharge Summary      Alma Mata MRN# [id number]   Age: 63 year old YOB: 1960     Date of Admission:  6/28/2023  Date of Discharge::  6/29/2023  Admitting Physician:  Lucy Caldwell MD  Discharge Physician:  Lucy Caldwell MD     PCP:  Margaret Parr    Disposition: Patient discharged from [facility] to home in stable condition.        Primary Diagnosis:   Rectal prolapse, pelvic organ prolapse            Discharge Medications:     Current Discharge Medication List      START taking these medications    Details   cephALEXin (KEFLEX) 500 MG capsule Take 1 capsule (500 mg) by mouth 2 times daily for 2 doses  Qty: 2 capsule, Refills: 0    Associated Diagnoses: Rectal prolapse      estradiol (ESTRACE) 0.1 MG/GM vaginal cream Pea size on a finger to the vagina Q hs  Qty: 42 g, Refills: 0    Associated Diagnoses: Rectal prolapse         CONTINUE these medications which have NOT CHANGED    Details   calcium-vitamin D 500-125 MG-UNIT TABS Take 1 tablet by mouth daily       diphenhydrAMINE (BENADRYL) 25 MG tablet Take 25 mg by mouth nightly as needed for itching (saesonal allergies)      fluticasone (FLONASE) 50 MCG/ACT nasal spray Spray 1 spray into both nostrils daily as needed (seasonal allergies)      latanoprost (XALATAN) 0.005 % ophthalmic solution Place 1 drop into both eyes every evening       MULTI-VITAMIN OR TABS Take 1 tablet by mouth daily   Qty: 30, Refills: 0      timolol maleate (TIMOPTIC) 0.5 % ophthalmic solution Place 1 drop into both eyes every morning                     Follow Up, Special Instructions:     Discharge diet: Regular   Discharge activity: Lifting restricted to <15 pounds for 6 weeks   Discharge follow-up: Follow up with Dr. Caldwell on 7/18 at 1:20 pm  Follow up with Dr. Downs/Dr. Oneal as directed   Wound care: May get incision wet in shower but do not soak or scrub              Procedures:     Procedure(s): robotic  assisted supracervical hysterectomy,  Davinci Salpingo-Oophorectomy Including Bilateral  robotic assisted sacrocolpopexy, cystoscopy  robotic ventral rectopexy with mesh, suture rectopexy                   Consultations:   None          Brief Hospital Summary:     Patient is a 63 year old female who underwent a robotic ventral rectopexy, suture rectopexy, supracervical hysterectomy & BSO, cystoscopy and sacrocolpopexy on 6/28/23 by Dr. Caldwell, Dr. Downs, and Dr. Oneal.   There were no immediate complications during this procedure.    Please refer to the full operative summary for details.  On POD#1 phosphorous was low at 2.1 so replacement was ordered.The patient's hospital course was otherwise unremarkable.  Pain was controlled on oral pain regimen.  She was tolerating a low fiber diet.  Bowel function had returned prior to discharge.  She recovered as anticipated and experienced no post-operative complications.         Attestation:  I have reviewed today's vital signs, notes, medications, labs and imaging.    Stephane Camara PA-C  Colorectal Physician Assistant    [facility]  [address] [address]  T: [phone]  F: [phone]         ADDENDUM:  Length of stay: 1 days  Indicate Y or N for the following:  UTI  No  C diff  No  PNA  No  SSI No  DVT No  PE  No  CVA No  MI No  Enterocutaneous fistula  No  Peripheral nerve injury  No  Abscess (not adjacent to anastomosis)  No  Leak No    Treated with:   Antibiotics N/A   Drain  N/A   Reoperation  N/A  Death within 30 days No  Reintubation  No  Reoperation  No   Procedure N/A

## 2023-06-29 NOTE — PROGRESS NOTES
"[facility]    Urology Progress Note     Assessment & Plan   Alma Mata is a 63 year old female who is POD#1 robotic supracervical hysterectomy, BSO, sacrocolpopexy, and rectopexy combo case with Dr Oneal, Dr Downs, and Dr Caldwell. Doing well.     Interval History   No events overnight. Tolerating full liquids. Minimal pain, controlled with tylenol. Denies vaginal bleeding. Passing flatus.     PVRs x4 charted, range from 102 ml to 272 ml. Feels she is voiding well, not straining, no hematuria.     AVSS    Plan:   - Appreciate nursing cares / bladder scans. OK to discontinue bladder scans, emptying well.   - Anticipate likely discharge to home today  - Discussed starting estrace cream and follow up appointments with Dr Oneal.     Sabine Hinton PA-C  [facility]  Pager: [phone]  Office: [phone]      OBJECTIVE:          /67   Pulse 77   Temp 98  F (36.7  C) (Oral)   Resp 16   Ht 1.753 m (5' 9\")   Wt 63 kg (138 lb 14.4 oz)   SpO2 99%   BMI 20.51 kg/m      Intake/Output Summary (Last 24 hours) at 6/29/2023 0817  Last data filed at 6/28/2023 2300  Gross per 24 hour   Intake 2640 ml   Output 310 ml   Net 2330 ml            General appearance shows no deformaties and good grooming in no acute distress.  EYES: no icterus  HEAD, EARS, NOSE, MOUTH, AND THROAT: atraumatic, normocephalic  CARDIAC: skin well perfused, no LE edema  RESPIRATORY: breathing unlabored  ABDOMEN: soft, non tender, non distended. Incisions c/d/i and jolie with glue.   SKIN/HAIR/NAILS: no visible rashes  NEUROLOGIC: no focal deficits  PSYCHIATRIC: Speech, mood, and affect normal    Sabine Hinton PA-C  [facility]   Pager: [phone]  Office: [phone]    "

## 2023-06-30 ENCOUNTER — PATIENT OUTREACH (OUTPATIENT)
Dept: FAMILY MEDICINE | Facility: CLINIC | Age: 63
End: 2023-06-30
Payer: COMMERCIAL

## 2023-06-30 NOTE — TELEPHONE ENCOUNTER
6/28/2023 - 6/29/2023 (29 hours)  [facility]   Alexandre Downs MD  Last attending   Treatment team  Rectal prolapse  Principal problem     Ilana MORENO RN   [facility] Triage

## 2023-07-01 ENCOUNTER — NURSE TRIAGE (OUTPATIENT)
Dept: NURSING | Facility: CLINIC | Age: 63
End: 2023-07-01
Payer: COMMERCIAL

## 2023-07-01 NOTE — TELEPHONE ENCOUNTER
Patient had surgery on Wednesday and has not had a bowel movement yet and is starting to feel constipated. Patient has treated with colace, taking three yesterday. Patient is drinking lots of water and moving regularly throughout the day.   Denies fever, bleeding, abdominal pain that is constant.   Some spotting and cramping but nothing constant or severe.   Patient is having some rectal heaviness.   Surgical second level triage recommended. Gave patient phone number for Dr. Caldwell to page directly.   Patient will call back with any further questions or worsening symptoms.   Katya Cordero RN   07/01/23 8:26 AM  [facility] Nurse Advisor    Reason for Disposition    Constipation    [1] Rectal pain or fullness from fecal impaction (rectum full of stool) AND [2] NOT better after SITZ bath, suppository or enema    Additional Information    Negative: Sounds like a life-threatening emergency to the triager    Negative: Chest pain    Negative: Difficulty breathing    Negative: Acting confused (e.g., disoriented, slurred speech) or excessively sleepy    Negative: Post-Op tonsil and adenoid surgery, symptoms or questions about    Negative: Surgical incision symptoms and questions    Negative: [1] Pain or burning with passing urine (urination) AND [2] male    Negative: [1] Pain or burning with passing urine (urination) AND [2] female    Negative: [1] Abdomen pain is main symptom AND [2] male    Negative: [1] Abdomen pain is main symptom AND [2] adult female    Negative: Rectal bleeding or blood in stool is main symptom    Negative: Rectal pain or itching is main symptom    Negative: Constipation in a cancer patient who is currently (or recently) receiving chemotherapy or radiation therapy, or cancer patient who has metastatic or end-stage cancer and is receiving palliative care    Negative: Patient sounds very sick or weak to the triager    Negative: [1] Vomiting AND [2] abdomen looks much more swollen than usual     Negative: [1] Vomiting AND [2] contains bile (green color)    Negative: [1] Constant abdominal pain AND [2] present > 2 hours    Protocols used: POST-OP SYMPTOMS AND [id number], CONSTIPATION-A-AH

## 2023-09-15 ENCOUNTER — HOSPITAL ENCOUNTER (OUTPATIENT)
Dept: MAMMOGRAPHY | Facility: CLINIC | Age: 63
Discharge: HOME | End: 2023-09-15
Attending: NURSE PRACTITIONER | Admitting: NURSE PRACTITIONER
Payer: COMMERCIAL

## 2023-09-15 DIAGNOSIS — Z12.31 VISIT FOR SCREENING MAMMOGRAM: ICD-10-CM

## 2023-09-15 PROCEDURE — 77067 SCR MAMMO BI INCL CAD: CPT

## 2023-09-21 ENCOUNTER — HOSPITAL ENCOUNTER (OUTPATIENT)
Dept: MAMMOGRAPHY | Facility: CLINIC | Age: 63
Discharge: HOME | End: 2023-09-21
Attending: NURSE PRACTITIONER
Payer: COMMERCIAL

## 2023-09-21 ENCOUNTER — HOSPITAL ENCOUNTER (OUTPATIENT)
Dept: ULTRASOUND IMAGING | Facility: CLINIC | Age: 63
Discharge: HOME | End: 2023-09-21
Attending: NURSE PRACTITIONER
Payer: COMMERCIAL

## 2023-09-21 DIAGNOSIS — R92.8 ABNORMAL MAMMOGRAM: ICD-10-CM

## 2023-09-21 PROCEDURE — 77065 DX MAMMO INCL CAD UNI: CPT | Mod: LT

## 2023-09-21 PROCEDURE — 76642 ULTRASOUND BREAST LIMITED: CPT | Mod: LT

## 2023-09-21 PROCEDURE — 77061 BREAST TOMOSYNTHESIS UNI: CPT | Mod: LT

## 2024-08-17 ENCOUNTER — HEALTH MAINTENANCE LETTER (OUTPATIENT)
Age: 64
End: 2024-08-17

## (undated) DEVICE — GLOVE PROTEXIS POWDER FREE 6.5 ORTHOPEDIC 2D73ET65

## (undated) DEVICE — DAVINCI XI NDL DRIVER MEGA SUTURE CUT 8MM 470309

## (undated) DEVICE — SU ETHIBOND 2-0 SHDA 30" X563H

## (undated) DEVICE — SU VICRYL 3-0 SH 27" J316H

## (undated) DEVICE — ENDO POUCH UNIVERSAL RETRIEVAL SYSTEM INZII 5MM CD003

## (undated) DEVICE — SOL WATER IRRIG 3000ML BAG 2B7117

## (undated) DEVICE — DAVINCI XI ESU FCP BIPOLAR MARYLAND 470172

## (undated) DEVICE — JELLY LUBRICATING SURGILUBE 2OZ TUBE

## (undated) DEVICE — GLOVE BIOGEL PI MICRO INDICATOR UNDERGLOVE SZ 6.5 48965

## (undated) DEVICE — ADH SKIN CLOSURE PREMIERPRO EXOFIN 1.0ML 3470

## (undated) DEVICE — DAVINCI XI FCP CADIERE 470049

## (undated) DEVICE — ENDO TROCAR CONMED AIRSEAL BLADELESS 05X120MM IAS5-120LP

## (undated) DEVICE — SU ETHIBOND 0 CT-2 30" X412H

## (undated) DEVICE — DRAPE LAP W/ARMBOARD 29410

## (undated) DEVICE — GOWN IMPERVIOUS SPECIALTY XL/XLONG 39049

## (undated) DEVICE — SU PDS II 3-0 SH 27" Z316H

## (undated) DEVICE — SOL WATER IRRIG 1000ML BOTTLE 2F7114

## (undated) DEVICE — KIT PATIENT POSITIONING PIGAZZI LATEX FREE 40580

## (undated) DEVICE — DAVINCI XI DRAPE ARM 470015

## (undated) DEVICE — DAVINCI HOT SHEARS TIP COVER  400180

## (undated) DEVICE — SUCTION IRR STRYKERFLOW II W/TIP 250-070-520

## (undated) DEVICE — DAVINCI XI MONOPOLAR SCISSORS HOT SHEARS 8MM 470179

## (undated) DEVICE — ESU GROUND PAD UNIVERSAL W/O CORD

## (undated) DEVICE — WIPES FOLEY CARE SURESTEP PROVON DFC100

## (undated) DEVICE — PACK DAVINCI GYN SMA15GDFS1

## (undated) DEVICE — SU MONOCRYL 4-0 PS-2 18" UND Y496G

## (undated) DEVICE — NDL INSUFFLATION 120MM VERRES 172015

## (undated) DEVICE — SUCTION CANISTER MEDIVAC LINER 3000ML W/LID 65651-530

## (undated) DEVICE — SOL NACL 0.9% INJ 1000ML BAG 2B1324X

## (undated) DEVICE — CATH TRAY FOLEY SURESTEP 16FR WDRAIN BAG STLK LATEX A300316A

## (undated) DEVICE — NDL INSUFFLATION 13GA 120MM C2201

## (undated) DEVICE — DAVINCI XI SEAL UNIVERSAL 5-8MM 470361

## (undated) DEVICE — BLADE CLIPPER 4406

## (undated) DEVICE — SOL NACL 0.9% IRRIG 1000ML BOTTLE 2F7124

## (undated) DEVICE — DRAPE IOBAN INCISE 23X17" 6650EZ

## (undated) DEVICE — DAVINCI XI NDL DRIVER LARGE 470006

## (undated) DEVICE — LINEN TOWEL PACK X5 5464

## (undated) DEVICE — GUIDEWIRE ZIPWIRE STD STR .035"X150CM M006630205B0

## (undated) DEVICE — TUBING IRRIG TUR Y TYPE 96" LF 6543-01

## (undated) DEVICE — SU VICRYL 0 UR-6 27" J603H

## (undated) DEVICE — SU PROLENE 2-0 SHDA 48" 8533H

## (undated) DEVICE — KIT ENDO TURNOVER/PROCEDURE W/CLEAN A SCOPE LINERS 103888

## (undated) DEVICE — CATH TRAY FOLEY SURESTEP 16FR W/URNE MTR STLK LATEX A303316A

## (undated) DEVICE — GLOVE PROTEXIS BLUE W/NEU-THERA 6.5  2D73EB65

## (undated) DEVICE — DAVINCI XI GRASPER ENDOWRIST BIPOLAR LONG 470400

## (undated) DEVICE — DAVINCI XI OBTURATOR BLADELESS 8MM 470359

## (undated) DEVICE — ESU PENCIL W/HOLSTER E2350H

## (undated) DEVICE — TUBING CONMED AIRSEAL SMOKE EVAC INSUFFLATION ASM-EVAC

## (undated) DEVICE — KIT SIGMOIDOSCOPE ENDOSCOPIC LIGHTED 18FR KI613/10

## (undated) DEVICE — GOWN IMPERVIOUS ZONED LG

## (undated) DEVICE — DAVINCI XI DRAPE COLUMN 470341

## (undated) DEVICE — GLOVE PROTEXIS MICRO 6.5  2D73PM65

## (undated) DEVICE — DAVINCI XI GRASPER FENESTRATED TIP UP 8MM 470347

## (undated) DEVICE — SU VICRYL 2-0 SH 27" J317H

## (undated) DEVICE — SYR 10ML FINGER CONTROL W/O NDL 309695

## (undated) DEVICE — Device

## (undated) DEVICE — GOWN LG DISP 9515

## (undated) DEVICE — SYSTEM LAPAROVUE VISIBILITY LAPVUE10

## (undated) DEVICE — DAVINCI XI RETR ENDOWRIST SMALL GRAPTOR 470318

## (undated) RX ORDER — HYDROMORPHONE HYDROCHLORIDE 1 MG/ML
INJECTION, SOLUTION INTRAMUSCULAR; INTRAVENOUS; SUBCUTANEOUS
Status: DISPENSED
Start: 2020-05-08

## (undated) RX ORDER — ONDANSETRON 2 MG/ML
INJECTION INTRAMUSCULAR; INTRAVENOUS
Status: DISPENSED
Start: 2020-05-08

## (undated) RX ORDER — METRONIDAZOLE 500 MG/100ML
INJECTION, SOLUTION INTRAVENOUS
Status: DISPENSED
Start: 2023-06-28

## (undated) RX ORDER — PROPOFOL 10 MG/ML
INJECTION, EMULSION INTRAVENOUS
Status: DISPENSED
Start: 2023-06-28

## (undated) RX ORDER — ACETAMINOPHEN 325 MG/1
TABLET ORAL
Status: DISPENSED
Start: 2020-05-08

## (undated) RX ORDER — BUPIVACAINE HYDROCHLORIDE 2.5 MG/ML
INJECTION, SOLUTION EPIDURAL; INFILTRATION; INTRACAUDAL
Status: DISPENSED
Start: 2020-05-08

## (undated) RX ORDER — NEOSTIGMINE METHYLSULFATE 1 MG/ML
VIAL (ML) INJECTION
Status: DISPENSED
Start: 2023-06-28

## (undated) RX ORDER — HYDROMORPHONE HYDROCHLORIDE 1 MG/ML
INJECTION, SOLUTION INTRAMUSCULAR; INTRAVENOUS; SUBCUTANEOUS
Status: DISPENSED
Start: 2023-06-28

## (undated) RX ORDER — CEFAZOLIN SODIUM 1 G/3ML
INJECTION, POWDER, FOR SOLUTION INTRAMUSCULAR; INTRAVENOUS
Status: DISPENSED
Start: 2023-06-28

## (undated) RX ORDER — PROPOFOL 10 MG/ML
INJECTION, EMULSION INTRAVENOUS
Status: DISPENSED
Start: 2020-05-08

## (undated) RX ORDER — CEFAZOLIN SODIUM 1 G/3ML
INJECTION, POWDER, FOR SOLUTION INTRAMUSCULAR; INTRAVENOUS
Status: DISPENSED
Start: 2020-05-08

## (undated) RX ORDER — ONDANSETRON 2 MG/ML
INJECTION INTRAMUSCULAR; INTRAVENOUS
Status: DISPENSED
Start: 2023-06-28

## (undated) RX ORDER — ACETAMINOPHEN 325 MG/1
TABLET ORAL
Status: DISPENSED
Start: 2023-06-28

## (undated) RX ORDER — DEXAMETHASONE SODIUM PHOSPHATE 4 MG/ML
INJECTION, SOLUTION INTRA-ARTICULAR; INTRALESIONAL; INTRAMUSCULAR; INTRAVENOUS; SOFT TISSUE
Status: DISPENSED
Start: 2020-05-08

## (undated) RX ORDER — GLYCOPYRROLATE 0.2 MG/ML
INJECTION, SOLUTION INTRAMUSCULAR; INTRAVENOUS
Status: DISPENSED
Start: 2020-05-08

## (undated) RX ORDER — FENTANYL CITRATE 50 UG/ML
INJECTION, SOLUTION INTRAMUSCULAR; INTRAVENOUS
Status: DISPENSED
Start: 2020-05-08

## (undated) RX ORDER — FENTANYL CITRATE 50 UG/ML
INJECTION, SOLUTION INTRAMUSCULAR; INTRAVENOUS
Status: DISPENSED
Start: 2023-06-27

## (undated) RX ORDER — FENTANYL CITRATE 50 UG/ML
INJECTION, SOLUTION INTRAMUSCULAR; INTRAVENOUS
Status: DISPENSED
Start: 2023-06-28

## (undated) RX ORDER — EPINEPHRINE 1 MG/ML
INJECTION, SOLUTION INTRAMUSCULAR; SUBCUTANEOUS
Status: DISPENSED
Start: 2023-06-28

## (undated) RX ORDER — SCOLOPAMINE TRANSDERMAL SYSTEM 1 MG/1
PATCH, EXTENDED RELEASE TRANSDERMAL
Status: DISPENSED
Start: 2023-06-28

## (undated) RX ORDER — BUPIVACAINE HYDROCHLORIDE 5 MG/ML
INJECTION, SOLUTION EPIDURAL; INTRACAUDAL
Status: DISPENSED
Start: 2023-06-28

## (undated) RX ORDER — GLYCOPYRROLATE 0.2 MG/ML
INJECTION, SOLUTION INTRAMUSCULAR; INTRAVENOUS
Status: DISPENSED
Start: 2023-06-28

## (undated) RX ORDER — LIDOCAINE HYDROCHLORIDE 20 MG/ML
INJECTION, SOLUTION EPIDURAL; INFILTRATION; INTRACAUDAL; PERINEURAL
Status: DISPENSED
Start: 2020-05-08

## (undated) RX ORDER — PHENAZOPYRIDINE HYDROCHLORIDE 200 MG/1
TABLET, FILM COATED ORAL
Status: DISPENSED
Start: 2023-06-28

## (undated) RX ORDER — CEFAZOLIN SODIUM 2 G/100ML
INJECTION, SOLUTION INTRAVENOUS
Status: DISPENSED
Start: 2020-05-08

## (undated) RX ORDER — BUPIVACAINE HYDROCHLORIDE 2.5 MG/ML
INJECTION, SOLUTION EPIDURAL; INFILTRATION; INTRACAUDAL
Status: DISPENSED
Start: 2023-06-28

## (undated) RX ORDER — DEXAMETHASONE SODIUM PHOSPHATE 4 MG/ML
INJECTION, SOLUTION INTRA-ARTICULAR; INTRALESIONAL; INTRAMUSCULAR; INTRAVENOUS; SOFT TISSUE
Status: DISPENSED
Start: 2023-06-28

## (undated) RX ORDER — CEFOXITIN 2 G/1
INJECTION, POWDER, FOR SOLUTION INTRAVENOUS
Status: DISPENSED
Start: 2023-06-28

## (undated) RX ORDER — KETOROLAC TROMETHAMINE 30 MG/ML
INJECTION, SOLUTION INTRAMUSCULAR; INTRAVENOUS
Status: DISPENSED
Start: 2020-05-08

## (undated) RX ORDER — SIMETHICONE 40MG/0.6ML
SUSPENSION, DROPS(FINAL DOSAGE FORM)(ML) ORAL
Status: DISPENSED
Start: 2023-06-27